# Patient Record
Sex: FEMALE | URBAN - METROPOLITAN AREA
[De-identification: names, ages, dates, MRNs, and addresses within clinical notes are randomized per-mention and may not be internally consistent; named-entity substitution may affect disease eponyms.]

---

## 2018-02-27 ENCOUNTER — INPATIENT (INPATIENT)
Facility: HOSPITAL | Age: 58
LOS: 5 days | Discharge: HOME | DRG: 871 | End: 2018-03-05
Attending: INTERNAL MEDICINE | Admitting: INTERNAL MEDICINE
Payer: MEDICAID

## 2018-02-27 VITALS
TEMPERATURE: 102 F | OXYGEN SATURATION: 95 % | HEART RATE: 103 BPM | RESPIRATION RATE: 18 BRPM | SYSTOLIC BLOOD PRESSURE: 131 MMHG | WEIGHT: 171.08 LBS | HEIGHT: 61 IN | DIASTOLIC BLOOD PRESSURE: 78 MMHG

## 2018-02-27 DIAGNOSIS — J10.1 INFLUENZA DUE TO OTHER IDENTIFIED INFLUENZA VIRUS WITH OTHER RESPIRATORY MANIFESTATIONS: ICD-10-CM

## 2018-02-27 DIAGNOSIS — K29.70 GASTRITIS, UNSPECIFIED, WITHOUT BLEEDING: ICD-10-CM

## 2018-02-27 DIAGNOSIS — Z98.890 OTHER SPECIFIED POSTPROCEDURAL STATES: Chronic | ICD-10-CM

## 2018-02-27 DIAGNOSIS — Z29.9 ENCOUNTER FOR PROPHYLACTIC MEASURES, UNSPECIFIED: ICD-10-CM

## 2018-02-27 LAB
ALBUMIN SERPL ELPH-MCNC: 3.6 G/DL — SIGNIFICANT CHANGE UP (ref 3.5–5)
ALP SERPL-CCNC: 96 U/L — SIGNIFICANT CHANGE UP (ref 40–120)
ALT FLD-CCNC: 48 U/L DA — SIGNIFICANT CHANGE UP (ref 10–60)
ANION GAP SERPL CALC-SCNC: 8 MMOL/L — SIGNIFICANT CHANGE UP (ref 5–17)
APPEARANCE UR: CLEAR — SIGNIFICANT CHANGE UP
APTT BLD: 31.8 SEC — SIGNIFICANT CHANGE UP (ref 27.5–37.4)
AST SERPL-CCNC: 30 U/L — SIGNIFICANT CHANGE UP (ref 10–40)
BASOPHILS # BLD AUTO: 0.1 K/UL — SIGNIFICANT CHANGE UP (ref 0–0.2)
BASOPHILS NFR BLD AUTO: 1.1 % — SIGNIFICANT CHANGE UP (ref 0–2)
BILIRUB SERPL-MCNC: 0.3 MG/DL — SIGNIFICANT CHANGE UP (ref 0.2–1.2)
BILIRUB UR-MCNC: NEGATIVE — SIGNIFICANT CHANGE UP
BUN SERPL-MCNC: 9 MG/DL — SIGNIFICANT CHANGE UP (ref 7–18)
CALCIUM SERPL-MCNC: 8.5 MG/DL — SIGNIFICANT CHANGE UP (ref 8.4–10.5)
CHLORIDE SERPL-SCNC: 103 MMOL/L — SIGNIFICANT CHANGE UP (ref 96–108)
CO2 SERPL-SCNC: 24 MMOL/L — SIGNIFICANT CHANGE UP (ref 22–31)
COLOR SPEC: YELLOW — SIGNIFICANT CHANGE UP
CREAT SERPL-MCNC: 0.66 MG/DL — SIGNIFICANT CHANGE UP (ref 0.5–1.3)
DIFF PNL FLD: ABNORMAL
EOSINOPHIL # BLD AUTO: 0 K/UL — SIGNIFICANT CHANGE UP (ref 0–0.5)
EOSINOPHIL NFR BLD AUTO: 0.4 % — SIGNIFICANT CHANGE UP (ref 0–6)
FLUAV SUBTYP SPEC NAA+PROBE: DETECTED
GLUCOSE SERPL-MCNC: 111 MG/DL — HIGH (ref 70–99)
GLUCOSE UR QL: NEGATIVE — SIGNIFICANT CHANGE UP
HCT VFR BLD CALC: 37.6 % — SIGNIFICANT CHANGE UP (ref 34.5–45)
HGB BLD-MCNC: 12.2 G/DL — SIGNIFICANT CHANGE UP (ref 11.5–15.5)
INR BLD: 1.2 RATIO — HIGH (ref 0.88–1.16)
KETONES UR-MCNC: NEGATIVE — SIGNIFICANT CHANGE UP
LACTATE SERPL-SCNC: 1.3 MMOL/L — SIGNIFICANT CHANGE UP (ref 0.7–2)
LEUKOCYTE ESTERASE UR-ACNC: NEGATIVE — SIGNIFICANT CHANGE UP
LYMPHOCYTES # BLD AUTO: 0.9 K/UL — LOW (ref 1–3.3)
LYMPHOCYTES # BLD AUTO: 15.2 % — SIGNIFICANT CHANGE UP (ref 13–44)
MCHC RBC-ENTMCNC: 29.5 PG — SIGNIFICANT CHANGE UP (ref 27–34)
MCHC RBC-ENTMCNC: 32.5 GM/DL — SIGNIFICANT CHANGE UP (ref 32–36)
MCV RBC AUTO: 90.7 FL — SIGNIFICANT CHANGE UP (ref 80–100)
MONOCYTES # BLD AUTO: 0.5 K/UL — SIGNIFICANT CHANGE UP (ref 0–0.9)
MONOCYTES NFR BLD AUTO: 8.1 % — SIGNIFICANT CHANGE UP (ref 2–14)
NEUTROPHILS # BLD AUTO: 4.3 K/UL — SIGNIFICANT CHANGE UP (ref 1.8–7.4)
NEUTROPHILS NFR BLD AUTO: 75.3 % — SIGNIFICANT CHANGE UP (ref 43–77)
NITRITE UR-MCNC: NEGATIVE — SIGNIFICANT CHANGE UP
PH UR: 5 — SIGNIFICANT CHANGE UP (ref 5–8)
PLATELET # BLD AUTO: 162 K/UL — SIGNIFICANT CHANGE UP (ref 150–400)
POTASSIUM SERPL-MCNC: 3.8 MMOL/L — SIGNIFICANT CHANGE UP (ref 3.5–5.3)
POTASSIUM SERPL-SCNC: 3.8 MMOL/L — SIGNIFICANT CHANGE UP (ref 3.5–5.3)
PROT SERPL-MCNC: 7.9 G/DL — SIGNIFICANT CHANGE UP (ref 6–8.3)
PROT UR-MCNC: 15
PROTHROM AB SERPL-ACNC: 13.1 SEC — HIGH (ref 9.8–12.7)
RAPID RVP RESULT: DETECTED
RBC # BLD: 4.14 M/UL — SIGNIFICANT CHANGE UP (ref 3.8–5.2)
RBC # FLD: 12.6 % — SIGNIFICANT CHANGE UP (ref 10.3–14.5)
SODIUM SERPL-SCNC: 135 MMOL/L — SIGNIFICANT CHANGE UP (ref 135–145)
SP GR SPEC: 1.02 — SIGNIFICANT CHANGE UP (ref 1.01–1.02)
UROBILINOGEN FLD QL: NEGATIVE — SIGNIFICANT CHANGE UP
WBC # BLD: 5.8 K/UL — SIGNIFICANT CHANGE UP (ref 3.8–10.5)
WBC # FLD AUTO: 5.8 K/UL — SIGNIFICANT CHANGE UP (ref 3.8–10.5)

## 2018-02-27 PROCEDURE — 71045 X-RAY EXAM CHEST 1 VIEW: CPT | Mod: 26

## 2018-02-27 PROCEDURE — 99285 EMERGENCY DEPT VISIT HI MDM: CPT

## 2018-02-27 RX ORDER — SODIUM CHLORIDE 9 MG/ML
500 INJECTION INTRAMUSCULAR; INTRAVENOUS; SUBCUTANEOUS
Qty: 0 | Refills: 0 | Status: DISCONTINUED | OUTPATIENT
Start: 2018-02-27 | End: 2018-02-27

## 2018-02-27 RX ORDER — PANTOPRAZOLE SODIUM 20 MG/1
40 TABLET, DELAYED RELEASE ORAL
Qty: 0 | Refills: 0 | Status: DISCONTINUED | OUTPATIENT
Start: 2018-02-27 | End: 2018-03-05

## 2018-02-27 RX ORDER — KETOROLAC TROMETHAMINE 30 MG/ML
30 SYRINGE (ML) INJECTION ONCE
Qty: 0 | Refills: 0 | Status: DISCONTINUED | OUTPATIENT
Start: 2018-02-27 | End: 2018-02-27

## 2018-02-27 RX ORDER — PANTOPRAZOLE SODIUM 20 MG/1
40 TABLET, DELAYED RELEASE ORAL ONCE
Qty: 0 | Refills: 0 | Status: COMPLETED | OUTPATIENT
Start: 2018-02-27 | End: 2018-02-27

## 2018-02-27 RX ORDER — SODIUM CHLORIDE 9 MG/ML
1000 INJECTION INTRAMUSCULAR; INTRAVENOUS; SUBCUTANEOUS
Qty: 0 | Refills: 0 | Status: DISCONTINUED | OUTPATIENT
Start: 2018-02-27 | End: 2018-02-27

## 2018-02-27 RX ORDER — SODIUM CHLORIDE 9 MG/ML
1000 INJECTION INTRAMUSCULAR; INTRAVENOUS; SUBCUTANEOUS
Qty: 0 | Refills: 0 | Status: DISCONTINUED | OUTPATIENT
Start: 2018-02-27 | End: 2018-03-05

## 2018-02-27 RX ORDER — ACETAMINOPHEN 500 MG
975 TABLET ORAL ONCE
Qty: 0 | Refills: 0 | Status: COMPLETED | OUTPATIENT
Start: 2018-02-27 | End: 2018-02-27

## 2018-02-27 RX ORDER — SUCRALFATE 1 G
1 TABLET ORAL
Qty: 0 | Refills: 0 | Status: DISCONTINUED | OUTPATIENT
Start: 2018-02-27 | End: 2018-03-05

## 2018-02-27 RX ORDER — OMEPRAZOLE 10 MG/1
1 CAPSULE, DELAYED RELEASE ORAL
Qty: 0 | Refills: 0 | COMMUNITY

## 2018-02-27 RX ORDER — OMEPRAZOLE 10 MG/1
0 CAPSULE, DELAYED RELEASE ORAL
Qty: 0 | Refills: 0 | COMMUNITY

## 2018-02-27 RX ORDER — SODIUM CHLORIDE 9 MG/ML
500 INJECTION INTRAMUSCULAR; INTRAVENOUS; SUBCUTANEOUS
Qty: 0 | Refills: 0 | Status: COMPLETED | OUTPATIENT
Start: 2018-02-27 | End: 2018-02-27

## 2018-02-27 RX ORDER — ENOXAPARIN SODIUM 100 MG/ML
40 INJECTION SUBCUTANEOUS DAILY
Qty: 0 | Refills: 0 | Status: DISCONTINUED | OUTPATIENT
Start: 2018-02-27 | End: 2018-03-05

## 2018-02-27 RX ORDER — LANOLIN ALCOHOL/MO/W.PET/CERES
3 CREAM (GRAM) TOPICAL AT BEDTIME
Qty: 0 | Refills: 0 | Status: DISCONTINUED | OUTPATIENT
Start: 2018-02-27 | End: 2018-03-05

## 2018-02-27 RX ORDER — ONDANSETRON 8 MG/1
4 TABLET, FILM COATED ORAL ONCE
Qty: 0 | Refills: 0 | Status: COMPLETED | OUTPATIENT
Start: 2018-02-27 | End: 2018-02-27

## 2018-02-27 RX ORDER — SODIUM CHLORIDE 9 MG/ML
3 INJECTION INTRAMUSCULAR; INTRAVENOUS; SUBCUTANEOUS ONCE
Qty: 0 | Refills: 0 | Status: COMPLETED | OUTPATIENT
Start: 2018-02-27 | End: 2018-02-27

## 2018-02-27 RX ORDER — ONDANSETRON 8 MG/1
4 TABLET, FILM COATED ORAL EVERY 4 HOURS
Qty: 0 | Refills: 0 | Status: DISCONTINUED | OUTPATIENT
Start: 2018-02-27 | End: 2018-03-05

## 2018-02-27 RX ORDER — INFLUENZA VIRUS VACCINE 15; 15; 15; 15 UG/.5ML; UG/.5ML; UG/.5ML; UG/.5ML
0.5 SUSPENSION INTRAMUSCULAR ONCE
Qty: 0 | Refills: 0 | Status: COMPLETED | OUTPATIENT
Start: 2018-02-27 | End: 2018-02-27

## 2018-02-27 RX ORDER — ACETAMINOPHEN 500 MG
650 TABLET ORAL EVERY 6 HOURS
Qty: 0 | Refills: 0 | Status: DISCONTINUED | OUTPATIENT
Start: 2018-02-27 | End: 2018-03-05

## 2018-02-27 RX ADMIN — Medication 200 MILLIGRAM(S): at 22:19

## 2018-02-27 RX ADMIN — SODIUM CHLORIDE 2000 MILLILITER(S): 9 INJECTION INTRAMUSCULAR; INTRAVENOUS; SUBCUTANEOUS at 11:44

## 2018-02-27 RX ADMIN — Medication 30 MILLIGRAM(S): at 19:15

## 2018-02-27 RX ADMIN — ONDANSETRON 4 MILLIGRAM(S): 8 TABLET, FILM COATED ORAL at 18:01

## 2018-02-27 RX ADMIN — Medication 975 MILLIGRAM(S): at 11:43

## 2018-02-27 RX ADMIN — SODIUM CHLORIDE 2000 MILLILITER(S): 9 INJECTION INTRAMUSCULAR; INTRAVENOUS; SUBCUTANEOUS at 11:45

## 2018-02-27 RX ADMIN — SODIUM CHLORIDE 3 MILLILITER(S): 9 INJECTION INTRAMUSCULAR; INTRAVENOUS; SUBCUTANEOUS at 11:42

## 2018-02-27 RX ADMIN — Medication 3 MILLIGRAM(S): at 23:06

## 2018-02-27 RX ADMIN — PANTOPRAZOLE SODIUM 40 MILLIGRAM(S): 20 TABLET, DELAYED RELEASE ORAL at 18:01

## 2018-02-27 RX ADMIN — SODIUM CHLORIDE 75 MILLILITER(S): 9 INJECTION INTRAMUSCULAR; INTRAVENOUS; SUBCUTANEOUS at 22:19

## 2018-02-27 RX ADMIN — Medication 75 MILLIGRAM(S): at 15:04

## 2018-02-27 RX ADMIN — SODIUM CHLORIDE 2000 MILLILITER(S): 9 INJECTION INTRAMUSCULAR; INTRAVENOUS; SUBCUTANEOUS at 12:12

## 2018-02-27 RX ADMIN — Medication 650 MILLIGRAM(S): at 21:12

## 2018-02-27 RX ADMIN — SODIUM CHLORIDE 2000 MILLILITER(S): 9 INJECTION INTRAMUSCULAR; INTRAVENOUS; SUBCUTANEOUS at 12:43

## 2018-02-27 RX ADMIN — SODIUM CHLORIDE 2000 MILLILITER(S): 9 INJECTION INTRAMUSCULAR; INTRAVENOUS; SUBCUTANEOUS at 12:11

## 2018-02-27 RX ADMIN — SODIUM CHLORIDE 150 MILLILITER(S): 9 INJECTION INTRAMUSCULAR; INTRAVENOUS; SUBCUTANEOUS at 18:03

## 2018-02-27 RX ADMIN — SODIUM CHLORIDE 75 MILLILITER(S): 9 INJECTION INTRAMUSCULAR; INTRAVENOUS; SUBCUTANEOUS at 19:16

## 2018-02-27 RX ADMIN — Medication 30 MILLIGRAM(S): at 15:04

## 2018-02-27 NOTE — ED PROVIDER NOTE - OBJECTIVE STATEMENT
57 y/o F pt with PMHx of gastritis presents to ED c/o fever, body aches, and cough x yesterday. Pt reports visiting her grandchildren last week who were sick with the flu. Pt did not have her flu-shot this season. Pt denies h/o asthma or DM. Pt denies neck pain, headache, abd pain, urinary symptoms, or any other complaints. NKDA.

## 2018-02-27 NOTE — H&P ADULT - NEGATIVE CARDIOVASCULAR SYMPTOMS
no chest pain/no dyspnea on exertion/no paroxysmal nocturnal dyspnea/no peripheral edema/no orthopnea/no palpitations

## 2018-02-27 NOTE — H&P ADULT - NSHPOUTPATIENTPROVIDERS_GEN_ALL_CORE
Dr Hannon Dr Hannon (gastroenterologist: [phone])  [facility] pharmacy: [phone]

## 2018-02-27 NOTE — H&P ADULT - MUSCULOSKELETAL
detailed exam no calf tenderness/ROM intact/no joint warmth/normal strength/no joint swelling/no joint erythema details…

## 2018-02-27 NOTE — MEDICAL STUDENT ADULT H&P (EDUCATION) - NS MD HP STUD SOCIAL HX FT
Denies smoking or ETOH use. 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 Lost health insurance.

## 2018-02-27 NOTE — ED PROVIDER NOTE - PROGRESS NOTE DETAILS
Pt is feeling much better, asking for food and would like to be discharged. Risks, benefits, alternatives, hazards and precautions reviewed. Pt was initially asking to leave because she was feeling better, but than she vomited 1 time. Will admit for iv hydration, antiemetics, monitoring and tamiflu.

## 2018-02-27 NOTE — H&P ADULT - PROBLEM SELECTOR PLAN 1
Patient presented with generalized weakness, fever, sore throat, cough x yesterday  Sick contact: grandson  s/p code sepsis in ED: 1L NS bolus + Levaquin 500mg IVPB + Tamiflu 75 mg  f/up Bcx and UCx  CXR: devoid of consolidations or effusion   EKG: NSR VR@94bpm  Continue with Tamiflu 75 mg BID x 5 days  Tylenol 650 mg PRN Fever  Zofran 4mg IV PRN N/V Patient presented with generalized weakness, fever, sore throat, cough x yesterday  Sick contact: grandson  s/p code sepsis in ED: 1L NS bolus + Levaquin 500mg IVPB + Tamiflu 75 mg  f/up Bcx and UCx  CXR: devoid of consolidations or effusion   EKG: NSR VR@94bpm  Continue with Tamiflu 75 mg BID x 5 days  Tylenol 650 mg PRN Fever  Zofran 4mg IV PRN N/V  Droplet isolation

## 2018-02-27 NOTE — ED PROVIDER NOTE - ENMT, MLM
Airway patent, Nasal mucosa clear. Mouth with normal mucosa. Throat has no vesicles, no oropharyngeal exudates and uvula is midline. No meningeal signs.

## 2018-02-27 NOTE — H&P ADULT - HISTORY OF PRESENT ILLNESS
Stacie Ricks is a 58F with PMHx gastritis, PUD who presented to ED c/o generalized weakness, fever that started yesterday. Patient refers taking care of grandchildren who were sick for the past week. Patient symptoms are accompanied by dry cough, sore throat, rhinorrhea, nausea and arthralgia. Patient was not vaccinated for influenza. Denies ear pain, pleuritic chest pain, diarrhea. Stacie Ricks is a 58F with PMHx gastritis, PUD who presented to ED c/o generalized weakness, fever that started yesterday. Patient refers taking care of grandchildren who were sick for the past week. Patient symptoms are accompanied by dry cough, sore throat, rhinorrhea, nausea and arthralgia. Patient was not vaccinated for influenza. Denies ear pain, pleuritic chest pain, diarrhea.  Refers taking Mucinex w/o improvement of symptoms.       ED course:   Vitals were: BP :131/78 mmHg, HR: 103 bpm, RR: 18 rpm, SaO2: 95% RA, T: 102.2 F  RVP: Influenza A  s/p code sepsis: 1L NS bolus + Levaquin 500 mg IVPB once + Tamiflu 75 mg + Zofran 4mg  Patient initially wanted to leave ED, but became nauseous and vomited x1.

## 2018-02-27 NOTE — MEDICAL STUDENT ADULT H&P (EDUCATION) - NS MD HP STUD PE ENMT FT
Tympanic membranes visualized, grey, reflect cone of light. No pharyngeal erythema, moist mucous membranes. Dental caries

## 2018-02-27 NOTE — MEDICAL STUDENT ADULT H&P (EDUCATION) - NS MD HP STUD HX OF PRESENT ILLNESS FT
Patient is a 57 yo  female with a PMHx of gastritis, broken wrist with closed reduction, and hysterectomy due due pre-endometrial cancer, present with symptoms of the flu. Yesterday afternoon patient started developed dry cough, with pressure like chest pain rated 8/10, throat pain rated 9/10, and headache in band-like distrubution rated 7/10, knee pain radiating up the thighs rated 9/10, and some arm pain. She also complains of some SOB. Tried taking Mucinex for cough, experienced some relief. 1 hour patient was asking to leave the ED when she became nauseous and vomited. Vomitus contained some blood. Patient also felt nauseous during interview, attempted to but did not vomit. Patient has sick contacts, exposed to grandson with cough last week, and nephew last Friday who had either the flu or a cold.

## 2018-02-27 NOTE — H&P ADULT - PROBLEM SELECTOR PLAN 3
IMPROVE VTE Individual Risk Assessment          RISK                                                          Points  [  ] Previous VTE                                                3  [  ] Thrombophilia                                             2  [ x ] Lower limb paralysis                                   2        (unable to hold up >15 seconds)    [  ] Current Cancer                                             2         (within 6 months)  [ x ] Immobilization > 24 hrs                              1  [  ] ICU/CCU stay > 24 hours                             1  [  ] Age > 60                                                         1    IMPROVE VTE Score: 3  Lovenox 40 mg SQ QD for DVT chemoppx

## 2018-02-27 NOTE — H&P ADULT - PROBLEM SELECTOR PLAN 2
Patient refers being diagnosed with PUD and Gastritis 2yrs ago (EGD)  Omeprazole 40 mg QD + Sucralfate 1g QD

## 2018-02-28 DIAGNOSIS — J06.9 ACUTE UPPER RESPIRATORY INFECTION, UNSPECIFIED: ICD-10-CM

## 2018-02-28 LAB
ANION GAP SERPL CALC-SCNC: 7 MMOL/L — SIGNIFICANT CHANGE UP (ref 5–17)
BASOPHILS # BLD AUTO: 0 K/UL — SIGNIFICANT CHANGE UP (ref 0–0.2)
BASOPHILS NFR BLD AUTO: 1.3 % — SIGNIFICANT CHANGE UP (ref 0–2)
BUN SERPL-MCNC: 5 MG/DL — LOW (ref 7–18)
CALCIUM SERPL-MCNC: 8.2 MG/DL — LOW (ref 8.4–10.5)
CHLORIDE SERPL-SCNC: 109 MMOL/L — HIGH (ref 96–108)
CO2 SERPL-SCNC: 23 MMOL/L — SIGNIFICANT CHANGE UP (ref 22–31)
CREAT SERPL-MCNC: 0.6 MG/DL — SIGNIFICANT CHANGE UP (ref 0.5–1.3)
CULTURE RESULTS: SIGNIFICANT CHANGE UP
EOSINOPHIL # BLD AUTO: 0 K/UL — SIGNIFICANT CHANGE UP (ref 0–0.5)
EOSINOPHIL NFR BLD AUTO: 0.3 % — SIGNIFICANT CHANGE UP (ref 0–6)
GLUCOSE BLDC GLUCOMTR-MCNC: 102 MG/DL — HIGH (ref 70–99)
GLUCOSE BLDC GLUCOMTR-MCNC: 93 MG/DL — SIGNIFICANT CHANGE UP (ref 70–99)
GLUCOSE SERPL-MCNC: 91 MG/DL — SIGNIFICANT CHANGE UP (ref 70–99)
HCT VFR BLD CALC: 34.7 % — SIGNIFICANT CHANGE UP (ref 34.5–45)
HGB BLD-MCNC: 11.4 G/DL — LOW (ref 11.5–15.5)
LYMPHOCYTES # BLD AUTO: 1.6 K/UL — SIGNIFICANT CHANGE UP (ref 1–3.3)
LYMPHOCYTES # BLD AUTO: 40.4 % — SIGNIFICANT CHANGE UP (ref 13–44)
MAGNESIUM SERPL-MCNC: 2 MG/DL — SIGNIFICANT CHANGE UP (ref 1.6–2.6)
MCHC RBC-ENTMCNC: 30 PG — SIGNIFICANT CHANGE UP (ref 27–34)
MCHC RBC-ENTMCNC: 32.8 GM/DL — SIGNIFICANT CHANGE UP (ref 32–36)
MCV RBC AUTO: 91.4 FL — SIGNIFICANT CHANGE UP (ref 80–100)
MONOCYTES # BLD AUTO: 0.4 K/UL — SIGNIFICANT CHANGE UP (ref 0–0.9)
MONOCYTES NFR BLD AUTO: 10.8 % — SIGNIFICANT CHANGE UP (ref 2–14)
NEUTROPHILS # BLD AUTO: 1.8 K/UL — SIGNIFICANT CHANGE UP (ref 1.8–7.4)
NEUTROPHILS NFR BLD AUTO: 47.2 % — SIGNIFICANT CHANGE UP (ref 43–77)
PHOSPHATE SERPL-MCNC: 2.8 MG/DL — SIGNIFICANT CHANGE UP (ref 2.5–4.5)
PLATELET # BLD AUTO: 140 K/UL — LOW (ref 150–400)
POTASSIUM SERPL-MCNC: 3.9 MMOL/L — SIGNIFICANT CHANGE UP (ref 3.5–5.3)
POTASSIUM SERPL-SCNC: 3.9 MMOL/L — SIGNIFICANT CHANGE UP (ref 3.5–5.3)
RBC # BLD: 3.8 M/UL — SIGNIFICANT CHANGE UP (ref 3.8–5.2)
RBC # FLD: 13.1 % — SIGNIFICANT CHANGE UP (ref 10.3–14.5)
SODIUM SERPL-SCNC: 139 MMOL/L — SIGNIFICANT CHANGE UP (ref 135–145)
SPECIMEN SOURCE: SIGNIFICANT CHANGE UP
WBC # BLD: 3.9 K/UL — SIGNIFICANT CHANGE UP (ref 3.8–10.5)
WBC # FLD AUTO: 3.9 K/UL — SIGNIFICANT CHANGE UP (ref 3.8–10.5)

## 2018-02-28 RX ORDER — ACETAMINOPHEN 500 MG
650 TABLET ORAL EVERY 6 HOURS
Qty: 0 | Refills: 0 | Status: DISCONTINUED | OUTPATIENT
Start: 2018-02-28 | End: 2018-03-05

## 2018-02-28 RX ADMIN — Medication 1 GRAM(S): at 11:50

## 2018-02-28 RX ADMIN — Medication 75 MILLIGRAM(S): at 18:01

## 2018-02-28 RX ADMIN — Medication 650 MILLIGRAM(S): at 05:03

## 2018-02-28 RX ADMIN — Medication 650 MILLIGRAM(S): at 18:30

## 2018-02-28 RX ADMIN — Medication 75 MILLIGRAM(S): at 05:04

## 2018-02-28 RX ADMIN — Medication 3 MILLIGRAM(S): at 21:27

## 2018-02-28 RX ADMIN — PANTOPRAZOLE SODIUM 40 MILLIGRAM(S): 20 TABLET, DELAYED RELEASE ORAL at 05:04

## 2018-02-28 RX ADMIN — Medication 650 MILLIGRAM(S): at 18:01

## 2018-02-28 RX ADMIN — Medication 30 MILLILITER(S): at 21:44

## 2018-02-28 RX ADMIN — ENOXAPARIN SODIUM 40 MILLIGRAM(S): 100 INJECTION SUBCUTANEOUS at 11:51

## 2018-02-28 NOTE — CONSULT NOTE ADULT - SUBJECTIVE AND OBJECTIVE BOX
PULMONARY CONSULT NOTE      STACIE KAHN  MRN-[id number]    Patient is a 58y old  Female who presents with a chief complaint of Influenza A (2018 18:15)      History of Present Illness:  Chief Complaint/Reason for Admission: Influenza A	  History of Present Illness: 	  Stacie Kahn is a 58F with PMHx gastritis, PUD who presented to ED c/o generalized weakness, fever that started yesterday. Patient refers taking care of grandchildren who were sick for the past week. Patient symptoms are accompanied by dry cough, sore throat, rhinorrhea, nausea and arthralgia. Patient was not vaccinated for influenza. Denies ear pain, pleuritic chest pain, diarrhea.  Refers taking Mucinex w/o improvement of symptoms.       HISTORY OF PRESENT ILLNESS:    MEDICATIONS  (STANDING):  enoxaparin Injectable 40 milliGRAM(s) SubCutaneous daily  melatonin 3 milliGRAM(s) Oral at bedtime  oseltamivir 75 milliGRAM(s) Oral two times a day  pantoprazole    Tablet 40 milliGRAM(s) Oral before breakfast  sodium chloride 0.9%. 1000 milliLiter(s) (75 mL/Hr) IV Continuous <Continuous>  sucralfate suspension 1 Gram(s) Oral daily      MEDICATIONS  (PRN):  acetaminophen   Tablet 650 milliGRAM(s) Oral every 6 hours PRN For Temp greater than 38 C (100.4 F)  guaiFENesin   Syrup  (Sugar-Free) 200 milliGRAM(s) Oral every 6 hours PRN Cough  ondansetron Injectable 4 milliGRAM(s) IV Push every 4 hours PRN Nausea and/or Vomiting      Allergies    No Known Allergies    Intolerances        PAST MEDICAL & SURGICAL HISTORY:  Gastritis  H/O: hysterectomy: 2002      FAMILY HISTORY:  No pertinent family history in first degree relatives      SOCIAL HISTORY  Smoking History:     REVIEW OF SYSTEMS:    CONSTITUTIONAL:  No fevers, chills, sweats    HEENT:  Eyes:  No diplopia or blurred vision. ENT:  No earache, sore throat or runny nose.    CARDIOVASCULAR:  No pressure, squeezing, tightness, or heaviness about the chest; no palpitations.    RESPIRATORY:  Per HPI    GASTROINTESTINAL:  No abdominal pain, nausea, vomiting or diarrhea.    GENITOURINARY:  No dysuria, frequency or urgency.    NEUROLOGIC:  No paresthesias, fasciculations, seizures or weakness.    PSYCHIATRIC:  No disorder of thought or mood.    Vital Signs Last 24 Hrs  T(C): 37.2 (2018 09:28), Max: 38.9 (2018 21:12)  T(F): 98.9 (2018 09:28), Max: 102.1 (2018 21:12)  HR: 89 (2018 04:30) (81 - 89)  BP: 136/76 (2018 04:30) (121/67 - 145/75)  BP(mean): --  RR: 18 (2018 04:30) (17 - 18)  SpO2: 99% (2018 04:30) (98% - 99%)  I&O's Detail    2018 07:01  -  2018 07:00  --------------------------------------------------------  IN:    sodium chloride 0.9%.: 675 mL  Total IN: 675 mL    OUT:  Total OUT: 0 mL    Total NET: 675 mL          PHYSICAL EXAMINATION:    GENERAL: The patient is a well-developed, well-nourished _____in no apparent distress.     HEENT: Head is normocephalic and atraumatic. Extraocular muscles are intact. Mucous membranes are moist.     NECK: Supple.     LUNGS: Ronchi posteriorly    HEART: Regular rate and rhythm without murmur.    ABDOMEN: Soft, nontender, and nondistended.  No hepatosplenomegaly is noted.    EXTREMITIES: Without any cyanosis, clubbing, rash, lesions or edema.    NEUROLOGIC: Grossly intact.      LABS:                        11.4   3.9   )-----------( 140      ( 2018 08:48 )             34.7     -28    139  |  109<H>  |  5<L>  ----------------------------<  91  3.9   |  23  |  0.60    Ca    8.2<L>      2018 08:48  Phos  2.8       Mg     2.0     02-28    TPro  7.9  /  Alb  3.6  /  TBili  0.3  /  DBili  x   /  AST  30  /  ALT  48  /  AlkPhos  96  -    PT/INR - ( 2018 12:05 )   PT: 13.1 sec;   INR: 1.20 ratio         PTT - ( 2018 12:05 )  PTT:31.8 sec  Urinalysis Basic - ( 2018 12:24 )    Color: Yellow / Appearance: Clear / S.025 / pH: x  Gluc: x / Ketone: Negative  / Bili: Negative / Urobili: Negative   Blood: x / Protein: 15 / Nitrite: Negative   Leuk Esterase: Negative / RBC: 0-2 /HPF / WBC 0-2 /HPF   Sq Epi: x / Non Sq Epi: Moderate /HPF / Bacteria: Few /HPF                      MICROBIOLOGY:    RADIOLOGY & ADDITIONAL STUDIES:    CXR:  < from: Xray Chest 1 View AP/PA (18 @ 12:38) >  IMPRESSION: No focal consolidation or pleural effusion.       < end of copied text >    Ct scan chest:    ekg;    echo:

## 2018-02-28 NOTE — PROGRESS NOTE ADULT - SUBJECTIVE AND OBJECTIVE BOX
58F with PMHx gastritis, PUD who presented to ED c/o generalized weakness, fever that started yesterday. Patient refers taking care of grandchildren who were sick for the past week. Patient symptoms are accompanied by dry cough, sore throat, rhinorrhea, nausea and arthralgia. Patient was not vaccinated for influenza. Denies ear pain, pleuritic chest pain, diarrhea.  Refers taking Mucinex w/o improvement of symptoms.       ED course:   Vitals were: BP :131/78 mmHg, HR: 103 bpm, RR: 18 rpm, SaO2: 95% RA, T: 102.2 F  RVP: Influenza A  s/p code sepsis: 1L NS bolus + Levaquin 500 mg IVPB once + Tamiflu 75 mg + Zofran 4mg  Patient initially wanted to leave ED, but became nauseous and vomited x1.    Review of Systems:  · General Symptoms	fever; chills; sweating; fatigue	  · Negative Skin Symptoms	no rash	  · Ophthalmologic	negative	  · Negative ENMT Symptoms	no ear pain; no tinnitus; no vertigo	  · ENMT Symptoms	nasal congestion  sore throat	  · Negative Respiratory and Thorax Symptoms	no wheezing; no hemoptysis; no pleuritic chest pain	  · Respiratory and Thorax Symptoms	dyspnea  cough	  · Negative Cardiovascular Symptoms	no chest pain; no palpitations; no dyspnea on exertion; no orthopnea; no paroxysmal nocturnal dyspnea; no peripheral edema	  · Negative Gastrointestinal Symptoms	no diarrhea	  · Gastrointestinal Symptoms	nausea; vomiting; flatulence; abdominal pain	  · Musculoskeletal Symptoms	arthralgia; myalgia; muscle weakness	  · Neurological	negative	  · Psychiatric	negative	      pt seen in icu [  ], reg med floor [ x  ], bed [ x ], chair at bedside [   ], a+o x3 [ x ], lethargic [  ],  nad [ x ]      Allergies    No Known Allergies        Vitals    T(F): 99.5 (02-28-18 @ 20:03), Max: 102.1 (02-27-18 @ 21:12)  HR: 72 (02-28-18 @ 20:03) (72 - 89)  BP: 126/77 (02-28-18 @ 20:03) (126/77 - 145/75)  RR: 19 (02-28-18 @ 20:03) (17 - 19)  SpO2: 96% (02-28-18 @ 20:03) (96% - 99%)  Wt(kg): --  CAPILLARY BLOOD GLUCOSE      POCT Blood Glucose.: 102 mg/dL (28 Feb 2018 11:15)      Labs                          11.4   3.9   )-----------( 140      ( 28 Feb 2018 08:48 )             34.7       02-28    139  |  109<H>  |  5<L>  ----------------------------<  91  3.9   |  23  |  0.60    Ca    8.2<L>      28 Feb 2018 08:48  Phos  2.8     02-28  Mg     2.0     02-28    TPro  7.9  /  Alb  3.6  /  TBili  0.3  /  DBili  x   /  AST  30  /  ALT  48  /  AlkPhos  96  02-27            .Urine Clean Catch (Midstream)  02-27 @ 17:56   <10,000 CFU/ml  Normal Urogenital delio present  --  --          Radiology Results      Meds    MEDICATIONS  (STANDING):  enoxaparin Injectable 40 milliGRAM(s) SubCutaneous daily  melatonin 3 milliGRAM(s) Oral at bedtime  oseltamivir 75 milliGRAM(s) Oral two times a day  pantoprazole    Tablet 40 milliGRAM(s) Oral before breakfast  sodium chloride 0.9%. 1000 milliLiter(s) (75 mL/Hr) IV Continuous <Continuous>  sucralfate suspension 1 Gram(s) Oral daily      MEDICATIONS  (PRN):  acetaminophen   Tablet 650 milliGRAM(s) Oral every 6 hours PRN For Temp greater than 38 C (100.4 F)  acetaminophen   Tablet. 650 milliGRAM(s) Oral every 6 hours PRN Moderate Pain (4 - 6)  guaiFENesin   Syrup  (Sugar-Free) 200 milliGRAM(s) Oral every 6 hours PRN Cough  ondansetron Injectable 4 milliGRAM(s) IV Push every 4 hours PRN Nausea and/or Vomiting      Physical Exam    Neuro :  no focal deficits  Respiratory: CTA B/L  CV: RRR, S1S2, no murmurs,   Abdominal: Soft, NT, ND +BS,  Extremities: No edema, + peripheral pulses    ASSESSMENT    sepsis 2nd to flu on adm  Influenza with other respiratory manifestations, other influenza virus identified  h/o Gastritis  H/O: hysterectomy  No significant past surgical history      PLAN    cont tamiflu to complete 5 days  cont tylenol  cont guaifenesin prn  pulm cons noted  cont current meds

## 2018-03-01 ENCOUNTER — OUTPATIENT (OUTPATIENT)
Dept: OUTPATIENT SERVICES | Facility: HOSPITAL | Age: 58
LOS: 1 days | End: 2018-03-01
Payer: MEDICAID

## 2018-03-01 DIAGNOSIS — R94.31 ABNORMAL ELECTROCARDIOGRAM [ECG] [EKG]: ICD-10-CM

## 2018-03-01 DIAGNOSIS — Z98.890 OTHER SPECIFIED POSTPROCEDURAL STATES: Chronic | ICD-10-CM

## 2018-03-01 LAB
CK MB BLD-MCNC: <1.1 % — SIGNIFICANT CHANGE UP (ref 0–3.5)
CK MB CFR SERPL CALC: <1 NG/ML — SIGNIFICANT CHANGE UP (ref 0–3.6)
CK SERPL-CCNC: 91 U/L — SIGNIFICANT CHANGE UP (ref 21–215)
TROPONIN I SERPL-MCNC: <0.015 NG/ML — SIGNIFICANT CHANGE UP (ref 0–0.04)

## 2018-03-01 PROCEDURE — G9001: CPT

## 2018-03-01 RX ORDER — ASPIRIN/CALCIUM CARB/MAGNESIUM 324 MG
81 TABLET ORAL DAILY
Qty: 0 | Refills: 0 | Status: DISCONTINUED | OUTPATIENT
Start: 2018-03-01 | End: 2018-03-05

## 2018-03-01 RX ORDER — FAMOTIDINE 10 MG/ML
20 INJECTION INTRAVENOUS ONCE
Qty: 0 | Refills: 0 | Status: COMPLETED | OUTPATIENT
Start: 2018-03-01 | End: 2018-03-01

## 2018-03-01 RX ORDER — ATORVASTATIN CALCIUM 80 MG/1
10 TABLET, FILM COATED ORAL AT BEDTIME
Qty: 0 | Refills: 0 | Status: DISCONTINUED | OUTPATIENT
Start: 2018-03-01 | End: 2018-03-05

## 2018-03-01 RX ADMIN — Medication 650 MILLIGRAM(S): at 09:13

## 2018-03-01 RX ADMIN — Medication 200 MILLIGRAM(S): at 09:06

## 2018-03-01 RX ADMIN — Medication 1 GRAM(S): at 23:06

## 2018-03-01 RX ADMIN — Medication 3 MILLIGRAM(S): at 23:06

## 2018-03-01 RX ADMIN — Medication 1 GRAM(S): at 11:54

## 2018-03-01 RX ADMIN — ATORVASTATIN CALCIUM 10 MILLIGRAM(S): 80 TABLET, FILM COATED ORAL at 23:06

## 2018-03-01 RX ADMIN — ENOXAPARIN SODIUM 40 MILLIGRAM(S): 100 INJECTION SUBCUTANEOUS at 11:55

## 2018-03-01 RX ADMIN — Medication 1 GRAM(S): at 17:13

## 2018-03-01 RX ADMIN — Medication 75 MILLIGRAM(S): at 17:13

## 2018-03-01 RX ADMIN — FAMOTIDINE 20 MILLIGRAM(S): 10 INJECTION INTRAVENOUS at 11:54

## 2018-03-01 RX ADMIN — Medication 75 MILLIGRAM(S): at 05:05

## 2018-03-01 RX ADMIN — Medication 81 MILLIGRAM(S): at 12:59

## 2018-03-01 RX ADMIN — PANTOPRAZOLE SODIUM 40 MILLIGRAM(S): 20 TABLET, DELAYED RELEASE ORAL at 05:05

## 2018-03-01 RX ADMIN — Medication 650 MILLIGRAM(S): at 11:59

## 2018-03-01 NOTE — CONSULT NOTE ADULT - ASSESSMENT
58 yr old Female with PMHx gastritis, PUD who presented to ED c/o generalized weakness, fever ,Influenza no with CP,SOB and EKG changes.  1.Transfer to Tele.  2.CEq8x3.  3.Echocardiogram.  4.CTA-r/o PE.  5.ABX.  6.Continue asa,statin.  7.GI and DVT prophylaxis.

## 2018-03-01 NOTE — CONSULT NOTE ADULT - SUBJECTIVE AND OBJECTIVE BOX
CHIEF COMPLAINT:Patient is a 58y old  Female who presents with a chief complaint of Influenza A .      HPI:  Stacie Ricks is a 58 yr old Female with PMHx gastritis, PUD who presented to ED c/o generalized weakness, fever that started yesterday. Patient refers taking care of grandchildren who were sick for the past week. Patient symptoms are accompanied by dry cough, sore throat, rhinorrhea, nausea and arthralgia. Patient was not vaccinated for influenza. Denies ear pain, pleuritic chest pain, diarrhea.  Refers taking Mucinex w/o improvement of symptoms.           PAST MEDICAL & SURGICAL HISTORY:  Gastritis  H/O: hysterectomy: 2002      MEDICATIONS  (STANDING):  aspirin  chewable 81 milliGRAM(s) Oral daily  atorvastatin 10 milliGRAM(s) Oral at bedtime  enoxaparin Injectable 40 milliGRAM(s) SubCutaneous daily  levoFLOXacin  Tablet 500 milliGRAM(s) Oral every 24 hours  melatonin 3 milliGRAM(s) Oral at bedtime  oseltamivir 75 milliGRAM(s) Oral two times a day  pantoprazole    Tablet 40 milliGRAM(s) Oral before breakfast  sodium chloride 0.9%. 1000 milliLiter(s) (75 mL/Hr) IV Continuous <Continuous>  sucralfate suspension 1 Gram(s) Oral daily    MEDICATIONS  (PRN):  acetaminophen   Tablet 650 milliGRAM(s) Oral every 6 hours PRN For Temp greater than 38 C (100.4 F)  acetaminophen   Tablet. 650 milliGRAM(s) Oral every 6 hours PRN Moderate Pain (4 - 6)  aluminum hydroxide/magnesium hydroxide/simethicone Suspension 30 milliLiter(s) Oral every 6 hours PRN Dyspepsia  guaiFENesin   Syrup  (Sugar-Free) 200 milliGRAM(s) Oral every 6 hours PRN Cough  ondansetron Injectable 4 milliGRAM(s) IV Push every 4 hours PRN Nausea and/or Vomiting      FAMILY HISTORY:  No pertinent family history in first degree relatives      SOCIAL HISTORY:    [x ] Non-smoker    [x ] Alcohol    Allergies    No Known Allergies    Intolerances    	    REVIEW OF SYSTEMS:  CONSTITUTIONAL: No fever, weight loss, or fatigue  EYES: No eye pain, visual disturbances, or discharge  ENT:  No difficulty hearing, tinnitus, vertigo; No sinus or throat pain  NECK: No pain or stiffness  RESPIRATORY: No cough, wheezing, chills or hemoptysis; + Shortness of Breath  CARDIOVASCULAR: + chest pain,No palpitations, passing out, dizziness, or leg swelling  GASTROINTESTINAL: No abdominal or epigastric pain. No nausea, vomiting, or hematemesis; No diarrhea or constipation. No melena or hematochezia.  GENITOURINARY: No dysuria, frequency, hematuria, or incontinence  NEUROLOGICAL: No headaches, memory loss, loss of strength, numbness, or tremors  SKIN: No itching, burning, rashes, or lesions   LYMPH Nodes: No enlarged glands  ENDOCRINE: No heat or cold intolerance; No hair loss  MUSCULOSKELETAL: No joint pain or swelling; No muscle, back, or extremity pain  PSYCHIATRIC: No depression, anxiety, mood swings, or difficulty sleeping  HEME/LYMPH: No easy bruising, or bleeding gums  ALLERGY AND IMMUNOLOGIC: No hives or eczema	      PHYSICAL EXAM:  T(C): 37.6 (03-01-18 @ 09:06), Max: 37.6 (03-01-18 @ 09:06)  HR: 71 (03-01-18 @ 09:06) (69 - 72)  BP: 117/71 (03-01-18 @ 09:06) (117/71 - 127/68)  RR: 16 (03-01-18 @ 09:06) (16 - 19)  SpO2: 94% (03-01-18 @ 09:06) (94% - 99%)      Appearance: Normal	  HEENT:   Normal oral mucosa, PERRL, EOMI	  Lymphatic: No lymphadenopathy  Cardiovascular: Normal S1 S2, No JVD, No murmurs, No edema  Respiratory: Lungs clear to auscultation	  Psychiatry: A & O x 3, Mood & affect appropriate  Gastrointestinal:  Soft, Non-tender, + BS	  Skin: No rashes, No ecchymoses, No cyanosis	  Neurologic: Non-focal  Extremities: Normal range of motion, No clubbing, cyanosis or edema  Vascular: Peripheral pulses palpable 2+ bilaterally    	    ECG:  NSR with T wave inversion kelsi-lateral leads	    	  LABS:	 	    CARDIAC MARKERS:  CARDIAC MARKERS ( 01 Mar 2018 10:08 )  <0.015 ng/mL / x     / 91 U/L / x     / <1.0 ng/mL                              11.4   3.9   )-----------( 140      ( 28 Feb 2018 08:48 )             34.7     02-28    139  |  109<H>  |  5<L>  ----------------------------<  91  3.9   |  23  |  0.60    Ca    8.2<L>      28 Feb 2018 08:48  Phos  2.8     02-28  Mg     2.0     02-28      Rapid Respiratory Viral Panel (02.27.18 @ 14:33)    Rapid RVP Result: Detected: The [employer] RVP Rapid uses polymerase chain reaction (PCR) and melt  curve analysis to screen for adenovirus; coronavirus HKU1, NL63, 229E,  OC43; human metapneumovirus (hMPV); human enterovirus/rhinovirus  (Entero/RV); influenza A; influenza A/H1;influenza A/H3; influenza  A/H1-2009; influenza B; parainfluenza viruses 1, 2, 3, 4; respiratory  syncytial virus; Bordetella pertussis; Mycoplasma pneumoniae; and  Chlamydophila pneumoniae.    Influenza A (RapRVP): Detected: TYPE:(C=Critical, N=Notification, A=Abnormal) C  TESTS: INFLUENZA  DATE/TIME CALLED: 02/27/18 14:36, 02/27/18 14:38  CALLED TO: DR. DUYEN MCCOLLUM  READ BACK (2 Patient Identifiers)(Y/N): Y  READ BACK VALUES (Y/N): Y  CALLED BY: NELSON,02/27/18 14:42  INFLUENZA A, EQUIVOCAL

## 2018-03-01 NOTE — PROGRESS NOTE ADULT - SUBJECTIVE AND OBJECTIVE BOX
Patient is a 58y old  Female who presents with a chief complaint of Influenza A (27 Feb 2018 18:15)    pt seen in icu [  ], reg med floor [ x  ], bed [  ], chair at bedside [ x  ], a+o x3 [ x ], lethargic [  ],  nad [ x ]      Allergies    No Known Allergies        Vitals    T(F): 99.7 (03-01-18 @ 09:06), Max: 99.7 (03-01-18 @ 09:06)  HR: 71 (03-01-18 @ 09:06) (69 - 72)  BP: 117/71 (03-01-18 @ 09:06) (117/71 - 127/68)  RR: 16 (03-01-18 @ 09:06) (16 - 19)  SpO2: 94% (03-01-18 @ 09:06) (94% - 99%)  Wt(kg): --  CAPILLARY BLOOD GLUCOSE      POCT Blood Glucose.: 102 mg/dL (28 Feb 2018 11:15)      Labs                          11.4   3.9   )-----------( 140      ( 28 Feb 2018 08:48 )             34.7       02-28    139  |  109<H>  |  5<L>  ----------------------------<  91  3.9   |  23  |  0.60    Ca    8.2<L>      28 Feb 2018 08:48  Phos  2.8     02-28  Mg     2.0     02-28    TPro  7.9  /  Alb  3.6  /  TBili  0.3  /  DBili  x   /  AST  30  /  ALT  48  /  AlkPhos  96  02-27      CARDIAC MARKERS ( 01 Mar 2018 10:08 )  <0.015 ng/mL / x     / 91 U/L / x     / <1.0 ng/mL        .Blood Blood-Peripheral  02-27 @ 23:23   No growth to date.  --  --      .Blood Blood-Peripheral  02-27 @ 23:22   No growth to date.  --  --      .Urine Clean Catch (Midstream)  02-27 @ 17:56   <10,000 CFU/ml  Normal Urogenital delio present  --  --          Radiology Results      Meds    MEDICATIONS  (STANDING):  enoxaparin Injectable 40 milliGRAM(s) SubCutaneous daily  famotidine Injectable 20 milliGRAM(s) IV Push once  melatonin 3 milliGRAM(s) Oral at bedtime  oseltamivir 75 milliGRAM(s) Oral two times a day  pantoprazole    Tablet 40 milliGRAM(s) Oral before breakfast  sodium chloride 0.9%. 1000 milliLiter(s) (75 mL/Hr) IV Continuous <Continuous>  sucralfate suspension 1 Gram(s) Oral daily      MEDICATIONS  (PRN):  acetaminophen   Tablet 650 milliGRAM(s) Oral every 6 hours PRN For Temp greater than 38 C (100.4 F)  acetaminophen   Tablet. 650 milliGRAM(s) Oral every 6 hours PRN Moderate Pain (4 - 6)  aluminum hydroxide/magnesium hydroxide/simethicone Suspension 30 milliLiter(s) Oral every 6 hours PRN Dyspepsia  guaiFENesin   Syrup  (Sugar-Free) 200 milliGRAM(s) Oral every 6 hours PRN Cough  ondansetron Injectable 4 milliGRAM(s) IV Push every 4 hours PRN Nausea and/or Vomiting      Physical Exam    Neuro :  no focal deficits  Respiratory: CTA B/L  CV: RRR, S1S2, no murmurs,   Abdominal: Soft, NT, ND +BS,  Extremities: No edema, + peripheral pulses    ASSESSMENT    sepsis 2nd to flu on adm  Influenza with other respiratory manifestations, other influenza virus identified  acute bronchitis  h/o Gastritis  H/O: hysterectomy  No significant past surgical history      PLAN    cont tamiflu to complete 5 days  cont levaquin 500 mg daily x 7 days  cont tylenol  cont guaifenesin prn  pulm f/u  cont current meds Patient is a 58y old  Female who presents with a chief complaint of Influenza A (27 Feb 2018 18:15)    pt seen in icu [  ], reg med floor [ x  ], bed [  ], chair at bedside [ x  ], a+o x3 [ x ], lethargic [  ],  nad [ x ]    c/o cp and palpitation this am      Allergies    No Known Allergies        Vitals    T(F): 99.7 (03-01-18 @ 09:06), Max: 99.7 (03-01-18 @ 09:06)  HR: 71 (03-01-18 @ 09:06) (69 - 72)  BP: 117/71 (03-01-18 @ 09:06) (117/71 - 127/68)  RR: 16 (03-01-18 @ 09:06) (16 - 19)  SpO2: 94% (03-01-18 @ 09:06) (94% - 99%)  Wt(kg): --  CAPILLARY BLOOD GLUCOSE      POCT Blood Glucose.: 102 mg/dL (28 Feb 2018 11:15)      Labs                          11.4   3.9   )-----------( 140      ( 28 Feb 2018 08:48 )             34.7       02-28    139  |  109<H>  |  5<L>  ----------------------------<  91  3.9   |  23  |  0.60    Ca    8.2<L>      28 Feb 2018 08:48  Phos  2.8     02-28  Mg     2.0     02-28    TPro  7.9  /  Alb  3.6  /  TBili  0.3  /  DBili  x   /  AST  30  /  ALT  48  /  AlkPhos  96  02-27      CARDIAC MARKERS ( 01 Mar 2018 10:08 )  <0.015 ng/mL / x     / 91 U/L / x     / <1.0 ng/mL        .Blood Blood-Peripheral  02-27 @ 23:23   No growth to date.  --  --      .Blood Blood-Peripheral  02-27 @ 23:22   No growth to date.  --  --      .Urine Clean Catch (Midstream)  02-27 @ 17:56   <10,000 CFU/ml  Normal Urogenital delio present  --  --      ekg with nsr @ 68bpm, irbbb, tinv 3,v5,v6      Radiology Results      Meds    MEDICATIONS  (STANDING):  enoxaparin Injectable 40 milliGRAM(s) SubCutaneous daily  famotidine Injectable 20 milliGRAM(s) IV Push once  melatonin 3 milliGRAM(s) Oral at bedtime  oseltamivir 75 milliGRAM(s) Oral two times a day  pantoprazole    Tablet 40 milliGRAM(s) Oral before breakfast  sodium chloride 0.9%. 1000 milliLiter(s) (75 mL/Hr) IV Continuous <Continuous>  sucralfate suspension 1 Gram(s) Oral daily      MEDICATIONS  (PRN):  acetaminophen   Tablet 650 milliGRAM(s) Oral every 6 hours PRN For Temp greater than 38 C (100.4 F)  acetaminophen   Tablet. 650 milliGRAM(s) Oral every 6 hours PRN Moderate Pain (4 - 6)  aluminum hydroxide/magnesium hydroxide/simethicone Suspension 30 milliLiter(s) Oral every 6 hours PRN Dyspepsia  guaiFENesin   Syrup  (Sugar-Free) 200 milliGRAM(s) Oral every 6 hours PRN Cough  ondansetron Injectable 4 milliGRAM(s) IV Push every 4 hours PRN Nausea and/or Vomiting      Physical Exam    Neuro :  no focal deficits  Respiratory: CTA B/L  CV: RRR, S1S2, no murmurs,   Abdominal: Soft, NT, ND +BS,  Extremities: No edema, + peripheral pulses    ASSESSMENT    chest pain, palp, ekg changes  r/o acs  sepsis 2nd to flu on adm  Influenza with other respiratory manifestations, other influenza virus identified  acute bronchitis  h/o Gastritis  H/O: hysterectomy  No significant past surgical history      PLAN    transfer to tele  start acs protocol  asa and statin  check ce q8 x 1/3 neg  cardio cons  f/u tsh, ft4, lipid panel  cont tamiflu to complete 5 days  cont levaquin 500 mg daily x 7 days  cont tylenol  cont guaifenesin prn  pulm f/u  cont current meds

## 2018-03-01 NOTE — CHART NOTE - NSCHARTNOTEFT_GEN_A_CORE
S= pt with complaints of chest pain  HPI:  Stacie Ricks is a 58F with PMHx gastritis, PUD who presented to ED c/o generalized weakness, fever that started yesterday. Patient refers taking care of grandchildren who were sick for the past week. Patient symptoms are accompanied by dry cough, sore throat, rhinorrhea, nausea and arthralgia. Patient was not vaccinated for influenza. Denies ear pain, pleuritic chest pain, diarrhea.  Refers taking Mucinex w/o improvement of symptoms.       ED course:   Vitals were: BP :131/78 mmHg, HR: 103 bpm, RR: 18 rpm, SaO2: 95% RA, T: 102.2 F  RVP: Influenza A  s/p code sepsis: 1L NS bolus + Levaquin 500 mg IVPB once + Tamiflu 75 mg + Zofran 4mg  Patient initially wanted to leave ED, but became nauseous and vomited x1. (27 Feb 2018 18:1    PE: pt seen standing and in no distress, complaining of chest pain especially on breathing  Heent: acceptable  CV: S1S2 RRR, no murmurs  Lungs: good air entry  GI: soft and non tender  : voids freely  EXT: no c/c/e                        11.4   3.9   )-----------( 140      ( 28 Feb 2018 08:48 )             34.7   Basic Metabolic Panel (02.28.18 @ 08:48)    Sodium, Serum: 139 mmol/L    Potassium, Serum: 3.9 mmol/L    Chloride, Serum: 109 mmol/L    Carbon Dioxide, Serum: 23 mmol/L    Anion Gap, Serum: 7 mmol/L    Blood Urea Nitrogen, Serum: 5 mg/dL    Creatinine, Serum: 0.60 mg/dL    Glucose, Serum: 91 mg/dL    Calcium, Total Serum: 8.2 mg/dL   EKG: sinus rhythm, inverted t waves in the lateral leads , incomplete right BBB  Troponin I, Serum: <0.015:   A/P 58 years old female admitted for influenza , now with complaints of chest pain and ekg changes  Spoke with Dr. Go and requested to transfer to telemmetry  start aspirin and lipitor  Cardiology evaluation.

## 2018-03-02 LAB — TROPONIN I SERPL-MCNC: <0.015 NG/ML — SIGNIFICANT CHANGE UP (ref 0–0.04)

## 2018-03-02 PROCEDURE — 71275 CT ANGIOGRAPHY CHEST: CPT | Mod: 26

## 2018-03-02 RX ORDER — GABAPENTIN 400 MG/1
100 CAPSULE ORAL THREE TIMES A DAY
Qty: 0 | Refills: 0 | Status: DISCONTINUED | OUTPATIENT
Start: 2018-03-02 | End: 2018-03-05

## 2018-03-02 RX ORDER — GABAPENTIN 400 MG/1
100 CAPSULE ORAL ONCE
Qty: 0 | Refills: 0 | Status: COMPLETED | OUTPATIENT
Start: 2018-03-02 | End: 2018-03-02

## 2018-03-02 RX ADMIN — Medication 1 GRAM(S): at 17:01

## 2018-03-02 RX ADMIN — GABAPENTIN 100 MILLIGRAM(S): 400 CAPSULE ORAL at 22:29

## 2018-03-02 RX ADMIN — Medication 75 MILLIGRAM(S): at 17:00

## 2018-03-02 RX ADMIN — GABAPENTIN 100 MILLIGRAM(S): 400 CAPSULE ORAL at 14:02

## 2018-03-02 RX ADMIN — Medication 1 GRAM(S): at 14:02

## 2018-03-02 RX ADMIN — GABAPENTIN 100 MILLIGRAM(S): 400 CAPSULE ORAL at 03:24

## 2018-03-02 RX ADMIN — GABAPENTIN 100 MILLIGRAM(S): 400 CAPSULE ORAL at 06:12

## 2018-03-02 RX ADMIN — Medication 75 MILLIGRAM(S): at 06:02

## 2018-03-02 RX ADMIN — Medication 1 GRAM(S): at 06:02

## 2018-03-02 RX ADMIN — Medication 30 MILLILITER(S): at 22:50

## 2018-03-02 RX ADMIN — ATORVASTATIN CALCIUM 10 MILLIGRAM(S): 80 TABLET, FILM COATED ORAL at 22:29

## 2018-03-02 RX ADMIN — ENOXAPARIN SODIUM 40 MILLIGRAM(S): 100 INJECTION SUBCUTANEOUS at 11:51

## 2018-03-02 RX ADMIN — PANTOPRAZOLE SODIUM 40 MILLIGRAM(S): 20 TABLET, DELAYED RELEASE ORAL at 06:02

## 2018-03-02 RX ADMIN — Medication 1 GRAM(S): at 23:50

## 2018-03-02 RX ADMIN — Medication 81 MILLIGRAM(S): at 11:51

## 2018-03-02 NOTE — PROGRESS NOTE ADULT - SUBJECTIVE AND OBJECTIVE BOX
CHIEF COMPLAINT:Patient is a 58y old  Female who presents with a chief complaint of Influenza.Pt still having chest pain.    	  REVIEW OF SYSTEMS:  CONSTITUTIONAL: No fever, weight loss, or fatigue  EYES: No eye pain, visual disturbances, or discharge  ENT:  No difficulty hearing, tinnitus, vertigo; No sinus or throat pain  NECK: No pain or stiffness  RESPIRATORY: No cough, wheezing, chills or hemoptysis; No Shortness of Breath  CARDIOVASCULAR: + chest pain,No palpitations, passing out, dizziness, or leg swelling  GASTROINTESTINAL: No abdominal or epigastric pain. No nausea, vomiting, or hematemesis; No diarrhea or constipation. No melena or hematochezia.  GENITOURINARY: No dysuria, frequency, hematuria, or incontinence  NEUROLOGICAL: No headaches, memory loss, loss of strength, numbness, or tremors  SKIN: No itching, burning, rashes, or lesions   LYMPH Nodes: No enlarged glands  ENDOCRINE: No heat or cold intolerance; No hair loss  MUSCULOSKELETAL: No joint pain or swelling; No muscle, back, or extremity pain  PSYCHIATRIC: No depression, anxiety, mood swings, or difficulty sleeping  HEME/LYMPH: No easy bruising, or bleeding gums  ALLERGY AND IMMUNOLOGIC: No hives or eczema	      PHYSICAL EXAM:  T(C): 36.3 (03-02-18 @ 07:51), Max: 37 (03-01-18 @ 21:25)  HR: 89 (03-02-18 @ 07:51) (65 - 89)  BP: 135/72 (03-02-18 @ 07:51) (116/73 - 135/72)  RR: 16 (03-02-18 @ 07:51) (16 - 17)  SpO2: 97% (03-02-18 @ 07:51) (96% - 97%)    I&O's Summary    02 Mar 2018 07:01  -  02 Mar 2018 10:38  --------------------------------------------------------  IN: 200 mL / OUT: 0 mL / NET: 200 mL        Appearance: Normal	  HEENT:   Normal oral mucosa, PERRL, EOMI	  Lymphatic: No lymphadenopathy  Cardiovascular: Normal S1 S2, No JVD, No murmurs, No edema  Respiratory: Lungs clear to auscultation	  Psychiatry: A & O x 3, Mood & affect appropriate  Gastrointestinal:  Soft, Non-tender, + BS	  Skin: No rashes, No ecchymoses, No cyanosis	  Neurologic: Non-focal  Extremities: Normal range of motion, No clubbing, cyanosis or edema  Vascular: Peripheral pulses palpable 2+ bilaterally    MEDICATIONS  (STANDING):  aspirin  chewable 81 milliGRAM(s) Oral daily  atorvastatin 10 milliGRAM(s) Oral at bedtime  enoxaparin Injectable 40 milliGRAM(s) SubCutaneous daily  gabapentin 100 milliGRAM(s) Oral three times a day  levoFLOXacin  Tablet 500 milliGRAM(s) Oral every 24 hours  melatonin 3 milliGRAM(s) Oral at bedtime  oseltamivir 75 milliGRAM(s) Oral two times a day  pantoprazole    Tablet 40 milliGRAM(s) Oral before breakfast  sodium chloride 0.9%. 1000 milliLiter(s) (75 mL/Hr) IV Continuous <Continuous>  sucralfate suspension 1 Gram(s) Oral four times a day      TELEMETRY: 	  NSR  	    	  LABS:	 	    CARDIAC MARKERS:  CARDIAC MARKERS ( 02 Mar 2018 03:21 )  <0.015 ng/mL / x     / x     / x     / x      CARDIAC MARKERS ( 01 Mar 2018 21:46 )  <0.015 ng/mL / x     / x     / x     / x      CARDIAC MARKERS ( 01 Mar 2018 16:02 )  <0.015 ng/mL / x     / x     / x     / x      CARDIAC MARKERS ( 01 Mar 2018 10:08 )  <0.015 ng/mL / x     / 91 U/L / x     / <1.0 ng/mL

## 2018-03-02 NOTE — PROGRESS NOTE ADULT - PROBLEM SELECTOR PLAN 4
IMPROVE VTE Individual Risk Assessment        RISK                                                          Points  [ x ] Lower limb paralysis                                   2   [ x ] Immobilization > 24 hrs                              1  IMPROVE VTE Score: 3, DVT PPx w/ Lovenox

## 2018-03-02 NOTE — PROGRESS NOTE ADULT - PROBLEM SELECTOR PLAN 2
S/p code sepsis in ED: 1L NS bolus + Levaquin 500mg IVPB + Tamiflu 75 mg  - UA negative, BCx and UCx NGTD  - CXR: devoid of consolidations or effusion   - Continue with Tamiflu 75 mg BID x 5 days

## 2018-03-02 NOTE — PROGRESS NOTE ADULT - SUBJECTIVE AND OBJECTIVE BOX
PGY 1 Note discussed with supervising resident and primary attending    Patient is a 58y old  Female who presents with a chief complaint of Influenza A (27 Feb 2018 18:15)      INTERVAL HPI/OVERNIGHT EVENTS: Patient seen and examined at bedside with no new complaints    MEDICATIONS  (STANDING):  aspirin  chewable 81 milliGRAM(s) Oral daily  atorvastatin 10 milliGRAM(s) Oral at bedtime  enoxaparin Injectable 40 milliGRAM(s) SubCutaneous daily  gabapentin 100 milliGRAM(s) Oral three times a day  levoFLOXacin  Tablet 500 milliGRAM(s) Oral every 24 hours  melatonin 3 milliGRAM(s) Oral at bedtime  oseltamivir 75 milliGRAM(s) Oral two times a day  pantoprazole    Tablet 40 milliGRAM(s) Oral before breakfast  sodium chloride 0.9%. 1000 milliLiter(s) (75 mL/Hr) IV Continuous <Continuous>  sucralfate suspension 1 Gram(s) Oral four times a day    MEDICATIONS  (PRN):  acetaminophen   Tablet 650 milliGRAM(s) Oral every 6 hours PRN For Temp greater than 38 C (100.4 F)  acetaminophen   Tablet. 650 milliGRAM(s) Oral every 6 hours PRN Moderate Pain (4 - 6)  aluminum hydroxide/magnesium hydroxide/simethicone Suspension 30 milliLiter(s) Oral every 6 hours PRN Dyspepsia  guaiFENesin   Syrup  (Sugar-Free) 200 milliGRAM(s) Oral every 6 hours PRN Cough  ondansetron Injectable 4 milliGRAM(s) IV Push every 4 hours PRN Nausea and/or Vomiting      __________________________________________________  REVIEW OF SYSTEMS:    CONSTITUTIONAL: No fever,   EYES: No acute visual disturbances  NECK: No pain or stiffness  RESPIRATORY: No cough; No shortness of breath  CARDIOVASCULAR: No chest pain, no palpitations  GASTROINTESTINAL: No pain. No nausea or vomiting; No diarrhea   NEUROLOGICAL: No headache or numbness, no tremors  MUSCULOSKELETAL: No joint pain, no muscle pain  GENITOURINARY: No dysuria, no frequency, no hesitancy  PSYCHIATRY: No depression , no anxiety  ALL OTHER  ROS negative        Vital Signs Last 24 Hrs  T(C): 36.4 (02 Mar 2018 04:58), Max: 37.6 (01 Mar 2018 09:06)  T(F): 97.6 (02 Mar 2018 04:58), Max: 99.7 (01 Mar 2018 09:06)  HR: 65 (02 Mar 2018 04:58) (65 - 73)  BP: 120/71 (02 Mar 2018 04:58) (116/73 - 126/74)  BP(mean): --  RR: 17 (02 Mar 2018 04:58) (16 - 17)  SpO2: 97% (02 Mar 2018 04:58) (94% - 97%)    ________________________________________________  PHYSICAL EXAM:  GENERAL: NAD  HEENT: Normocephalic;  conjunctivae and sclerae clear; moist mucous membranes;   NECK : supple  CHEST/LUNG: Clear to auscultation bilaterally with good air entry   HEART: S1 S2  regular; no murmurs, gallops or rubs  ABDOMEN: Soft, Nontender, Nondistended; Bowel sounds present  EXTREMITIES: No cyanosis; no edema; no calf tenderness  NERVOUS SYSTEM:  Awake and alert; Oriented  to place, person and time ; no new deficits    _________________________________________________  LABS:                        11.4   3.9   )-----------( 140      ( 28 Feb 2018 08:48 )             34.7     02-28    139  |  109<H>  |  5<L>  ----------------------------<  91  3.9   |  23  |  0.60    Ca    8.2<L>      28 Feb 2018 08:48  Phos  2.8     02-28  Mg     2.0     02-28          CAPILLARY BLOOD GLUCOSE            RADIOLOGY & ADDITIONAL TESTS:    Imaging Personally Reviewed:  YES    Consultant(s) Notes Reviewed:   YES    Care Discussed with Consultants : YES    Plan of care was discussed with patient and /or primary care giver; all questions and concerns were addressed and care was aligned with patient's wishes. Strong peripheral pulses

## 2018-03-02 NOTE — PROGRESS NOTE ADULT - SUBJECTIVE AND OBJECTIVE BOX
Patient is a 58y old  Female who presents with a chief complaint of Influenza A (27 Feb 2018 18:15)  Awake, alert, comfortable OOB to chair in NAD.    INTERVAL HPI/OVERNIGHT EVENTS:      VITAL SIGNS:  T(F): 98.6 (03-02-18 @ 11:13)  HR: 81 (03-02-18 @ 11:13)  BP: 122/74 (03-02-18 @ 11:13)  RR: 16 (03-02-18 @ 11:13)  SpO2: 95% (03-02-18 @ 11:13)  Wt(kg): --  I&O's Detail    02 Mar 2018 07:01  -  02 Mar 2018 14:05  --------------------------------------------------------  IN:    Oral Fluid: 200 mL  Total IN: 200 mL    OUT:  Total OUT: 0 mL    Total NET: 200 mL              REVIEW OF SYSTEMS:    CONSTITUTIONAL:  No fevers, chills, sweats    HEENT:  Eyes:  No diplopia or blurred vision. ENT:  No earache, sore throat or runny nose.    CARDIOVASCULAR:  No pressure, squeezing, tightness, or heaviness about the chest; no palpitations.    RESPIRATORY:  Per HPI    GASTROINTESTINAL:  No abdominal pain, nausea, vomiting or diarrhea.    GENITOURINARY:  No dysuria, frequency or urgency.    NEUROLOGIC:  No paresthesias, fasciculations, seizures or weakness.    PSYCHIATRIC:  No disorder of thought or mood.      PHYSICAL EXAM:    Constitutional: Well developed and nourished  Eyes:Perrla  ENMT: normal  Neck:supple  Respiratory: good air entry  Cardiovascular: S1 S2 regular  Gastrointestinal: Soft, Non tender  Extremities: No edema  Vascular:normal  Neurological:Awake, alert,Ox3  Musculoskeletal:Normal      MEDICATIONS  (STANDING):  aspirin  chewable 81 milliGRAM(s) Oral daily  atorvastatin 10 milliGRAM(s) Oral at bedtime  enoxaparin Injectable 40 milliGRAM(s) SubCutaneous daily  gabapentin 100 milliGRAM(s) Oral three times a day  levoFLOXacin  Tablet 500 milliGRAM(s) Oral every 24 hours  melatonin 3 milliGRAM(s) Oral at bedtime  oseltamivir 75 milliGRAM(s) Oral two times a day  pantoprazole    Tablet 40 milliGRAM(s) Oral before breakfast  sodium chloride 0.9%. 1000 milliLiter(s) (75 mL/Hr) IV Continuous <Continuous>  sucralfate suspension 1 Gram(s) Oral four times a day    MEDICATIONS  (PRN):  acetaminophen   Tablet 650 milliGRAM(s) Oral every 6 hours PRN For Temp greater than 38 C (100.4 F)  acetaminophen   Tablet. 650 milliGRAM(s) Oral every 6 hours PRN Moderate Pain (4 - 6)  aluminum hydroxide/magnesium hydroxide/simethicone Suspension 30 milliLiter(s) Oral every 6 hours PRN Dyspepsia  guaiFENesin   Syrup  (Sugar-Free) 200 milliGRAM(s) Oral every 6 hours PRN Cough  ondansetron Injectable 4 milliGRAM(s) IV Push every 4 hours PRN Nausea and/or Vomiting      Allergies    No Known Allergies    Intolerances        LABS:                CARDIAC MARKERS ( 02 Mar 2018 03:21 )  <0.015 ng/mL / x     / x     / x     / x      CARDIAC MARKERS ( 01 Mar 2018 21:46 )  <0.015 ng/mL / x     / x     / x     / x      CARDIAC MARKERS ( 01 Mar 2018 16:02 )  <0.015 ng/mL / x     / x     / x     / x      CARDIAC MARKERS ( 01 Mar 2018 10:08 )  <0.015 ng/mL / x     / 91 U/L / x     / <1.0 ng/mL      CAPILLARY BLOOD GLUCOSE            RADIOLOGY & ADDITIONAL TESTS:    CXR:    Ct scan chest:    ekg;    echo:

## 2018-03-02 NOTE — PROGRESS NOTE ADULT - SUBJECTIVE AND OBJECTIVE BOX
Patient is a 58y old  Female who presents with a chief complaint of Influenza A (27 Feb 2018 18:15)    pt seen in tele [x  ], reg med floor [  ], bed [  ], chair at bedside [ x  ], a+o x3 [ x ], lethargic [  ],  nad [ x ]      Allergies    No Known Allergies        Vitals    T(F): 97.4 (03-02-18 @ 07:51), Max: 98.6 (03-01-18 @ 21:25)  HR: 89 (03-02-18 @ 07:51) (65 - 89)  BP: 135/72 (03-02-18 @ 07:51) (116/73 - 135/72)  RR: 16 (03-02-18 @ 07:51) (16 - 17)  SpO2: 97% (03-02-18 @ 07:51) (96% - 97%)  Wt(kg): --  CAPILLARY BLOOD GLUCOSE      POCT Blood Glucose.: 102 mg/dL (28 Feb 2018 11:15)      Labs                CARDIAC MARKERS ( 02 Mar 2018 03:21 )  <0.015 ng/mL / x     / x     / x     / x      CARDIAC MARKERS ( 01 Mar 2018 21:46 )  <0.015 ng/mL / x     / x     / x     / x      CARDIAC MARKERS ( 01 Mar 2018 16:02 )  <0.015 ng/mL / x     / x     / x     / x      CARDIAC MARKERS ( 01 Mar 2018 10:08 )  <0.015 ng/mL / x     / 91 U/L / x     / <1.0 ng/mL        .Blood Blood-Peripheral  02-27 @ 23:23   No growth to date.  --  --      .Blood Blood-Peripheral  02-27 @ 23:22   No growth to date.  --  --      .Urine Clean Catch (Midstream)  02-27 @ 17:56   <10,000 CFU/ml  Normal Urogenital delio present  --  --          Radiology Results      Meds    MEDICATIONS  (STANDING):  aspirin  chewable 81 milliGRAM(s) Oral daily  atorvastatin 10 milliGRAM(s) Oral at bedtime  enoxaparin Injectable 40 milliGRAM(s) SubCutaneous daily  gabapentin 100 milliGRAM(s) Oral three times a day  levoFLOXacin  Tablet 500 milliGRAM(s) Oral every 24 hours  melatonin 3 milliGRAM(s) Oral at bedtime  oseltamivir 75 milliGRAM(s) Oral two times a day  pantoprazole    Tablet 40 milliGRAM(s) Oral before breakfast  sodium chloride 0.9%. 1000 milliLiter(s) (75 mL/Hr) IV Continuous <Continuous>  sucralfate suspension 1 Gram(s) Oral four times a day      MEDICATIONS  (PRN):  acetaminophen   Tablet 650 milliGRAM(s) Oral every 6 hours PRN For Temp greater than 38 C (100.4 F)  acetaminophen   Tablet. 650 milliGRAM(s) Oral every 6 hours PRN Moderate Pain (4 - 6)  aluminum hydroxide/magnesium hydroxide/simethicone Suspension 30 milliLiter(s) Oral every 6 hours PRN Dyspepsia  guaiFENesin   Syrup  (Sugar-Free) 200 milliGRAM(s) Oral every 6 hours PRN Cough  ondansetron Injectable 4 milliGRAM(s) IV Push every 4 hours PRN Nausea and/or Vomiting      Physical Exam    Neuro :  no focal deficits  Respiratory: CTA B/L  CV: RRR, S1S2, no murmurs,   Abdominal: Soft, NT, ND +BS,  Extremities: No edema, + peripheral pulses    ASSESSMENT    chest pain, palp, ekg changes  r/o acs  sepsis 2nd to flu on adm  Influenza with other respiratory manifestations, other influenza virus identified  acute bronchitis  h/o Gastritis  H/O: hysterectomy  No significant past surgical history      PLAN    transfer to tele  start acs protocol  asa and statin  ce q8 x 4 neg noted above  f/u echo  f/u cta to r/o pe  cardio f/u  f/u tsh, ft4, lipid panel  cont tamiflu to complete 5 days  cont levaquin 500 mg daily x 7 days  cont tylenol  cont guaifenesin prn  pulm f/u  cont current meds

## 2018-03-03 DIAGNOSIS — J18.9 PNEUMONIA, UNSPECIFIED ORGANISM: ICD-10-CM

## 2018-03-03 LAB
ANION GAP SERPL CALC-SCNC: 9 MMOL/L — SIGNIFICANT CHANGE UP (ref 5–17)
BUN SERPL-MCNC: 8 MG/DL — SIGNIFICANT CHANGE UP (ref 7–18)
CALCIUM SERPL-MCNC: 9 MG/DL — SIGNIFICANT CHANGE UP (ref 8.4–10.5)
CHLORIDE SERPL-SCNC: 105 MMOL/L — SIGNIFICANT CHANGE UP (ref 96–108)
CO2 SERPL-SCNC: 27 MMOL/L — SIGNIFICANT CHANGE UP (ref 22–31)
CREAT SERPL-MCNC: 0.65 MG/DL — SIGNIFICANT CHANGE UP (ref 0.5–1.3)
EOSINOPHIL NFR BLD AUTO: 3 % — SIGNIFICANT CHANGE UP (ref 0–6)
GLUCOSE SERPL-MCNC: 95 MG/DL — SIGNIFICANT CHANGE UP (ref 70–99)
HCT VFR BLD CALC: 37 % — SIGNIFICANT CHANGE UP (ref 34.5–45)
HGB BLD-MCNC: 12.4 G/DL — SIGNIFICANT CHANGE UP (ref 11.5–15.5)
LYMPHOCYTES # BLD AUTO: 58 % — HIGH (ref 13–44)
MCHC RBC-ENTMCNC: 30.4 PG — SIGNIFICANT CHANGE UP (ref 27–34)
MCHC RBC-ENTMCNC: 33.6 GM/DL — SIGNIFICANT CHANGE UP (ref 32–36)
MCV RBC AUTO: 90.6 FL — SIGNIFICANT CHANGE UP (ref 80–100)
MONOCYTES NFR BLD AUTO: 11 % — SIGNIFICANT CHANGE UP (ref 2–14)
NEUTROPHILS NFR BLD AUTO: 21 % — LOW (ref 43–77)
PLATELET # BLD AUTO: 161 K/UL — SIGNIFICANT CHANGE UP (ref 150–400)
POTASSIUM SERPL-MCNC: 3.8 MMOL/L — SIGNIFICANT CHANGE UP (ref 3.5–5.3)
POTASSIUM SERPL-SCNC: 3.8 MMOL/L — SIGNIFICANT CHANGE UP (ref 3.5–5.3)
RBC # BLD: 4.08 M/UL — SIGNIFICANT CHANGE UP (ref 3.8–5.2)
RBC # FLD: 12.3 % — SIGNIFICANT CHANGE UP (ref 10.3–14.5)
SODIUM SERPL-SCNC: 141 MMOL/L — SIGNIFICANT CHANGE UP (ref 135–145)
WBC # BLD: 3.6 K/UL — LOW (ref 3.8–10.5)
WBC # FLD AUTO: 3.6 K/UL — LOW (ref 3.8–10.5)

## 2018-03-03 RX ADMIN — PANTOPRAZOLE SODIUM 40 MILLIGRAM(S): 20 TABLET, DELAYED RELEASE ORAL at 05:29

## 2018-03-03 RX ADMIN — GABAPENTIN 100 MILLIGRAM(S): 400 CAPSULE ORAL at 14:38

## 2018-03-03 RX ADMIN — Medication 75 MILLIGRAM(S): at 05:29

## 2018-03-03 RX ADMIN — GABAPENTIN 100 MILLIGRAM(S): 400 CAPSULE ORAL at 21:43

## 2018-03-03 RX ADMIN — Medication 1 GRAM(S): at 12:36

## 2018-03-03 RX ADMIN — Medication 1 GRAM(S): at 21:43

## 2018-03-03 RX ADMIN — Medication 1 GRAM(S): at 17:09

## 2018-03-03 RX ADMIN — ATORVASTATIN CALCIUM 10 MILLIGRAM(S): 80 TABLET, FILM COATED ORAL at 21:43

## 2018-03-03 RX ADMIN — ENOXAPARIN SODIUM 40 MILLIGRAM(S): 100 INJECTION SUBCUTANEOUS at 12:36

## 2018-03-03 RX ADMIN — Medication 81 MILLIGRAM(S): at 12:36

## 2018-03-03 RX ADMIN — Medication 1 GRAM(S): at 05:30

## 2018-03-03 RX ADMIN — GABAPENTIN 100 MILLIGRAM(S): 400 CAPSULE ORAL at 05:29

## 2018-03-03 RX ADMIN — Medication 75 MILLIGRAM(S): at 17:10

## 2018-03-03 RX ADMIN — Medication 3 MILLIGRAM(S): at 21:43

## 2018-03-03 NOTE — PROGRESS NOTE ADULT - SUBJECTIVE AND OBJECTIVE BOX
Patient is a 58y old  Female who presents with a chief complaint of Influenza A (27 Feb 2018 18:15)    pt seen in tele [x  ], reg med floor [  ], bed [  ], chair at bedside [ x  ], a+o x3 [ x ], lethargic [  ],  nad [ x ]      Allergies    No Known Allergies        Vitals    T(F): 97.9 (03-03-18 @ 11:56), Max: 98.2 (03-02-18 @ 19:39)  HR: 85 (03-03-18 @ 11:56) (69 - 85)  BP: 112/75 (03-03-18 @ 11:56) (112/75 - 127/74)  RR: 18 (03-03-18 @ 11:56) (15 - 18)  SpO2: 97% (03-03-18 @ 11:56) (95% - 99%)  Wt(kg): --  CAPILLARY BLOOD GLUCOSE          Labs                          12.4   3.6   )-----------( 161      ( 03 Mar 2018 07:03 )             37.0       03-03    141  |  105  |  8   ----------------------------<  95  3.8   |  27  |  0.65    Ca    9.0      03 Mar 2018 07:03        CARDIAC MARKERS ( 02 Mar 2018 03:21 )  <0.015 ng/mL / x     / x     / x     / x      CARDIAC MARKERS ( 01 Mar 2018 21:46 )  <0.015 ng/mL / x     / x     / x     / x      CARDIAC MARKERS ( 01 Mar 2018 16:02 )  <0.015 ng/mL / x     / x     / x     / x            .Blood Blood-Peripheral  02-27 @ 23:23   No growth to date.  --  --      .Blood Blood-Peripheral  02-27 @ 23:22   No growth to date.  --  --      .Urine Clean Catch (Midstream)  02-27 @ 17:56   <10,000 CFU/ml  Normal Urogenital delio present  --  --    < from: Transthoracic Echocardiogram (03.02.18 @ 07:24) >  CONCLUSIONS:  1. Mitral annular calcification. Mild mitral regurgitation.    2. Normal trileaflet aortic valve.  3. Aortic Root: 3.8 cm.  4. Normal left atrium.  5. Normal left ventricular internal dimensions and wall  thicknesses.  6. Normal Left Ventricular Systolic Function,  (EF = 55 to  60%)  7. Grade II diastolic dysfunction.    < end of copied text >        Radiology Results    < from: CT Angio Chest w/ IV Cont (03.02.18 @ 15:59) >  IMPRESSION:     No pulmonary embolism.    Left lower lobe pneumonia. Follow-up to resolution recommended.    < end of copied text >        Meds    MEDICATIONS  (STANDING):  aspirin  chewable 81 milliGRAM(s) Oral daily  atorvastatin 10 milliGRAM(s) Oral at bedtime  enoxaparin Injectable 40 milliGRAM(s) SubCutaneous daily  gabapentin 100 milliGRAM(s) Oral three times a day  levoFLOXacin  Tablet 500 milliGRAM(s) Oral every 24 hours  melatonin 3 milliGRAM(s) Oral at bedtime  oseltamivir 75 milliGRAM(s) Oral two times a day  pantoprazole    Tablet 40 milliGRAM(s) Oral before breakfast  sodium chloride 0.9%. 1000 milliLiter(s) (75 mL/Hr) IV Continuous <Continuous>  sucralfate suspension 1 Gram(s) Oral four times a day      MEDICATIONS  (PRN):  acetaminophen   Tablet 650 milliGRAM(s) Oral every 6 hours PRN For Temp greater than 38 C (100.4 F)  acetaminophen   Tablet. 650 milliGRAM(s) Oral every 6 hours PRN Moderate Pain (4 - 6)  guaiFENesin   Syrup  (Sugar-Free) 200 milliGRAM(s) Oral every 6 hours PRN Cough  ondansetron Injectable 4 milliGRAM(s) IV Push every 4 hours PRN Nausea and/or Vomiting      Physical Exam    Neuro :  no focal deficits  Respiratory: CTA B/L  CV: RRR, S1S2, no murmurs,   Abdominal: Soft, NT, ND +BS,  Extremities: No edema, + peripheral pulses    ASSESSMENT    chest pain, palp, ekg changes  r/o acs  sepsis 2nd to flu on adm  Influenza with other respiratory manifestations, other influenza virus identified  left lower lobe pna  h/o Gastritis  H/O: hysterectomy  No significant past surgical history      PLAN    transfer to [facility]  start acs protocol  asa and statin  ce q8 x 4 neg noted above  echo with EF = 55 to60%. Grade II diastolic dysfunction.  cta neg for pe but positive for left lower lobe pna noted above  cardio f/u noted  f/u stress trest on monday  f/u tsh, ft4, lipid panel  cont tamiflu to complete 5 days  cont levaquin 500 mg daily x 7 days  cont tylenol  cont guaifenesin prn  pulm f/u noted  cont current meds

## 2018-03-03 NOTE — PROGRESS NOTE ADULT - SUBJECTIVE AND OBJECTIVE BOX
PGY 1 Note discussed with supervising resident and primary attending    Patient is a 58y old  Female who presents with a chief complaint of Influenza A (27 Feb 2018 18:15)      INTERVAL HPI/OVERNIGHT EVENTS: Patient seen and examined at bedside with no new complaints - no acute events, pending stress test Monday    MEDICATIONS  (STANDING):  aspirin  chewable 81 milliGRAM(s) Oral daily  atorvastatin 10 milliGRAM(s) Oral at bedtime  enoxaparin Injectable 40 milliGRAM(s) SubCutaneous daily  gabapentin 100 milliGRAM(s) Oral three times a day  levoFLOXacin  Tablet 500 milliGRAM(s) Oral every 24 hours  melatonin 3 milliGRAM(s) Oral at bedtime  oseltamivir 75 milliGRAM(s) Oral two times a day  pantoprazole    Tablet 40 milliGRAM(s) Oral before breakfast  sodium chloride 0.9%. 1000 milliLiter(s) (75 mL/Hr) IV Continuous <Continuous>  sucralfate suspension 1 Gram(s) Oral four times a day    MEDICATIONS  (PRN):  acetaminophen   Tablet 650 milliGRAM(s) Oral every 6 hours PRN For Temp greater than 38 C (100.4 F)  acetaminophen   Tablet. 650 milliGRAM(s) Oral every 6 hours PRN Moderate Pain (4 - 6)  guaiFENesin   Syrup  (Sugar-Free) 200 milliGRAM(s) Oral every 6 hours PRN Cough  ondansetron Injectable 4 milliGRAM(s) IV Push every 4 hours PRN Nausea and/or Vomiting      __________________________________________________  REVIEW OF SYSTEMS:    CONSTITUTIONAL: No fever,   EYES: No acute visual disturbances  NECK: No pain or stiffness  RESPIRATORY: No cough; No shortness of breath  CARDIOVASCULAR: No chest pain, no palpitations  GASTROINTESTINAL: No pain. No nausea or vomiting; No diarrhea   NEUROLOGICAL: No headache or numbness, no tremors  MUSCULOSKELETAL: No joint pain, no muscle pain  GENITOURINARY: No dysuria, no frequency, no hesitancy  PSYCHIATRY: No depression , no anxiety  ALL OTHER  ROS negative        Vital Signs Last 24 Hrs  T(C): 37.2 (03 Mar 2018 19:50), Max: 37.2 (03 Mar 2018 19:50)  T(F): 98.9 (03 Mar 2018 19:50), Max: 98.9 (03 Mar 2018 19:50)  HR: 76 (03 Mar 2018 19:50) (69 - 85)  BP: 122/73 (03 Mar 2018 19:50) (112/75 - 123/71)  BP(mean): --  RR: 18 (03 Mar 2018 19:50) (15 - 18)  SpO2: 96% (03 Mar 2018 19:50) (96% - 99%)    ________________________________________________  PHYSICAL EXAM:  GENERAL: NAD  HEENT: Normocephalic;  conjunctivae and sclerae clear; moist mucous membranes;   NECK : supple  CHEST/LUNG: Clear to auscultation bilaterally with good air entry   HEART: S1 S2  regular; no murmurs, gallops or rubs  ABDOMEN: Soft, Nontender, Nondistended; Bowel sounds present  EXTREMITIES: No cyanosis; no edema; no calf tenderness  NERVOUS SYSTEM:  Awake and alert; Oriented  to place, person and time ; no new deficits    _________________________________________________  LABS:                        12.4   3.6   )-----------( 161      ( 03 Mar 2018 07:03 )             37.0     03-03    141  |  105  |  8   ----------------------------<  95  3.8   |  27  |  0.65    Ca    9.0      03 Mar 2018 07:03          CAPILLARY BLOOD GLUCOSE            RADIOLOGY & ADDITIONAL TESTS:    Imaging Personally Reviewed:  YES    Consultant(s) Notes Reviewed:   YES    Care Discussed with Consultants : YES    Plan of care was discussed with patient and /or primary care giver; all questions and concerns were addressed and care was aligned with patient's wishes.

## 2018-03-03 NOTE — PROGRESS NOTE ADULT - PROBLEM SELECTOR PLAN 2
S/p code sepsis in ED: 1L NS bolus + Levaquin 500mg IVPB + Tamiflu 75 mg  - UA negative, BCx and UCx NGTD  - CXR: devoid of consolidations or effusion  ***CTA showed LLL PNA; c/w Levaquin Day 3  - Continue with Tamiflu 75 mg BID x 5 days

## 2018-03-03 NOTE — PROGRESS NOTE ADULT - SUBJECTIVE AND OBJECTIVE BOX
Patient is a 58y old  Female who presents with a chief complaint of Influenza A (27 Feb 2018 18:15)  Pt alert, awake, oriented x3, resting in bed in NAD.    INTERVAL HPI/OVERNIGHT EVENTS:      VITAL SIGNS:  T(F): 97.8 (03-03-18 @ 07:52)  HR: 69 (03-03-18 @ 07:52)  BP: 123/71 (03-03-18 @ 07:52)  RR: 15 (03-03-18 @ 07:52)  SpO2: 97% (03-03-18 @ 07:52)  Wt(kg): --  I&O's Detail    02 Mar 2018 07:01  -  03 Mar 2018 07:00  --------------------------------------------------------  IN:    Oral Fluid: 200 mL  Total IN: 200 mL    OUT:  Total OUT: 0 mL    Total NET: 200 mL              REVIEW OF SYSTEMS:    CONSTITUTIONAL:  No fevers, chills, sweats    HEENT:  Eyes:  No diplopia or blurred vision. ENT:  No earache, sore throat or runny nose.    CARDIOVASCULAR:  No pressure, squeezing, tightness, or heaviness about the chest; no palpitations.    RESPIRATORY:  Per HPI    GASTROINTESTINAL:  No abdominal pain, nausea, vomiting or diarrhea.    GENITOURINARY:  No dysuria, frequency or urgency.    NEUROLOGIC:  No paresthesias, fasciculations, seizures or weakness.    PSYCHIATRIC:  No disorder of thought or mood.      PHYSICAL EXAM:    Constitutional: Well developed and nourished  Eyes:Perrla  ENMT: normal  Neck:supple  Respiratory: good air entry  Cardiovascular: S1 S2 regular  Gastrointestinal: Soft, Non tender  Extremities: No edema  Vascular:normal  Neurological:Awake, alert,Ox3  Musculoskeletal:Normal      MEDICATIONS  (STANDING):  aspirin  chewable 81 milliGRAM(s) Oral daily  atorvastatin 10 milliGRAM(s) Oral at bedtime  enoxaparin Injectable 40 milliGRAM(s) SubCutaneous daily  gabapentin 100 milliGRAM(s) Oral three times a day  levoFLOXacin  Tablet 500 milliGRAM(s) Oral every 24 hours  melatonin 3 milliGRAM(s) Oral at bedtime  oseltamivir 75 milliGRAM(s) Oral two times a day  pantoprazole    Tablet 40 milliGRAM(s) Oral before breakfast  sodium chloride 0.9%. 1000 milliLiter(s) (75 mL/Hr) IV Continuous <Continuous>  sucralfate suspension 1 Gram(s) Oral four times a day    MEDICATIONS  (PRN):  acetaminophen   Tablet 650 milliGRAM(s) Oral every 6 hours PRN For Temp greater than 38 C (100.4 F)  acetaminophen   Tablet. 650 milliGRAM(s) Oral every 6 hours PRN Moderate Pain (4 - 6)  guaiFENesin   Syrup  (Sugar-Free) 200 milliGRAM(s) Oral every 6 hours PRN Cough  ondansetron Injectable 4 milliGRAM(s) IV Push every 4 hours PRN Nausea and/or Vomiting      Allergies    No Known Allergies    Intolerances        LABS:                        12.4   3.6   )-----------( 161      ( 03 Mar 2018 07:03 )             37.0     03-03    141  |  105  |  8   ----------------------------<  95  3.8   |  27  |  0.65    Ca    9.0      03 Mar 2018 07:03            CARDIAC MARKERS ( 02 Mar 2018 03:21 )  <0.015 ng/mL / x     / x     / x     / x      CARDIAC MARKERS ( 01 Mar 2018 21:46 )  <0.015 ng/mL / x     / x     / x     / x      CARDIAC MARKERS ( 01 Mar 2018 16:02 )  <0.015 ng/mL / x     / x     / x     / x      CARDIAC MARKERS ( 01 Mar 2018 10:08 )  <0.015 ng/mL / x     / 91 U/L / x     / <1.0 ng/mL      CAPILLARY BLOOD GLUCOSE            RADIOLOGY & ADDITIONAL TESTS:    CXR:  < from: Xray Chest 1 View AP/PA (02.27.18 @ 12:38) >  FINDINGS:There is no focal consolidation or pleural effusion.  Heart size   cannot be accurately evaluated in this projection. The osseous structures   are intact.    IMPRESSION: No focal consolidation or pleural effusion.       < end of copied text >    Ct scan chest:    ekg;    echo: Patient is a 58y old  Female who presents with a chief complaint of Influenza A (27 Feb 2018 18:15)  Pt alert, awake, oriented x3, resting comfortable in bed in NAD.    INTERVAL HPI/OVERNIGHT EVENTS:      VITAL SIGNS:  T(F): 97.8 (03-03-18 @ 07:52)  HR: 69 (03-03-18 @ 07:52)  BP: 123/71 (03-03-18 @ 07:52)  RR: 15 (03-03-18 @ 07:52)  SpO2: 97% (03-03-18 @ 07:52)  Wt(kg): --  I&O's Detail    02 Mar 2018 07:01  -  03 Mar 2018 07:00  --------------------------------------------------------  IN:    Oral Fluid: 200 mL  Total IN: 200 mL    OUT:  Total OUT: 0 mL    Total NET: 200 mL              REVIEW OF SYSTEMS:    CONSTITUTIONAL:  No fevers, chills, sweats    HEENT:  Eyes:  No diplopia or blurred vision. ENT:  No earache, sore throat or runny nose.    CARDIOVASCULAR:  No pressure, squeezing, tightness, or heaviness about the chest; no palpitations.    RESPIRATORY:  Per HPI    GASTROINTESTINAL:  No abdominal pain, nausea, vomiting or diarrhea.    GENITOURINARY:  No dysuria, frequency or urgency.    NEUROLOGIC:  No paresthesias, fasciculations, seizures or weakness.    PSYCHIATRIC:  No disorder of thought or mood.      PHYSICAL EXAM:    Constitutional: Well developed and nourished  Eyes:Perrla  ENMT: normal  Neck:supple  Respiratory: good air entry  Cardiovascular: S1 S2 regular  Gastrointestinal: Soft, Non tender  Extremities: No edema  Vascular:normal  Neurological:Awake, alert,Ox3  Musculoskeletal:Normal      MEDICATIONS  (STANDING):  aspirin  chewable 81 milliGRAM(s) Oral daily  atorvastatin 10 milliGRAM(s) Oral at bedtime  enoxaparin Injectable 40 milliGRAM(s) SubCutaneous daily  gabapentin 100 milliGRAM(s) Oral three times a day  levoFLOXacin  Tablet 500 milliGRAM(s) Oral every 24 hours  melatonin 3 milliGRAM(s) Oral at bedtime  oseltamivir 75 milliGRAM(s) Oral two times a day  pantoprazole    Tablet 40 milliGRAM(s) Oral before breakfast  sodium chloride 0.9%. 1000 milliLiter(s) (75 mL/Hr) IV Continuous <Continuous>  sucralfate suspension 1 Gram(s) Oral four times a day    MEDICATIONS  (PRN):  acetaminophen   Tablet 650 milliGRAM(s) Oral every 6 hours PRN For Temp greater than 38 C (100.4 F)  acetaminophen   Tablet. 650 milliGRAM(s) Oral every 6 hours PRN Moderate Pain (4 - 6)  guaiFENesin   Syrup  (Sugar-Free) 200 milliGRAM(s) Oral every 6 hours PRN Cough  ondansetron Injectable 4 milliGRAM(s) IV Push every 4 hours PRN Nausea and/or Vomiting      Allergies    No Known Allergies    Intolerances        LABS:                        12.4   3.6   )-----------( 161      ( 03 Mar 2018 07:03 )             37.0     03-03    141  |  105  |  8   ----------------------------<  95  3.8   |  27  |  0.65    Ca    9.0      03 Mar 2018 07:03            CARDIAC MARKERS ( 02 Mar 2018 03:21 )  <0.015 ng/mL / x     / x     / x     / x      CARDIAC MARKERS ( 01 Mar 2018 21:46 )  <0.015 ng/mL / x     / x     / x     / x      CARDIAC MARKERS ( 01 Mar 2018 16:02 )  <0.015 ng/mL / x     / x     / x     / x      CARDIAC MARKERS ( 01 Mar 2018 10:08 )  <0.015 ng/mL / x     / 91 U/L / x     / <1.0 ng/mL      CAPILLARY BLOOD GLUCOSE            RADIOLOGY & ADDITIONAL TESTS:    CXR:  < from: Xray Chest 1 View AP/PA (02.27.18 @ 12:38) >  FINDINGS:There is no focal consolidation or pleural effusion.  Heart size   cannot be accurately evaluated in this projection. The osseous structures   are intact.    IMPRESSION: No focal consolidation or pleural effusion.       < end of copied text >    Ct scan chest:    ekg;    echo: Patient is a 58y old  Female who presents with a chief complaint of Influenza A (27 Feb 2018 18:15)  Pt alert, awake, oriented x3, resting comfortable in bed in NAD.    INTERVAL HPI/OVERNIGHT EVENTS:      VITAL SIGNS:  T(F): 97.8 (03-03-18 @ 07:52)  HR: 69 (03-03-18 @ 07:52)  BP: 123/71 (03-03-18 @ 07:52)  RR: 15 (03-03-18 @ 07:52)  SpO2: 97% (03-03-18 @ 07:52)  Wt(kg): --  I&O's Detail    02 Mar 2018 07:01  -  03 Mar 2018 07:00  --------------------------------------------------------  IN:    Oral Fluid: 200 mL  Total IN: 200 mL    OUT:  Total OUT: 0 mL    Total NET: 200 mL              REVIEW OF SYSTEMS:    CONSTITUTIONAL:  No fevers, chills, sweats    HEENT:  Eyes:  No diplopia or blurred vision. ENT:  No earache, sore throat or runny nose.    CARDIOVASCULAR:  No pressure, squeezing, tightness, or heaviness about the chest; no palpitations.    RESPIRATORY:  Per HPI    GASTROINTESTINAL:  No abdominal pain, nausea, vomiting or diarrhea.    GENITOURINARY:  No dysuria, frequency or urgency.    NEUROLOGIC:  No paresthesias, fasciculations, seizures or weakness.    PSYCHIATRIC:  No disorder of thought or mood.      PHYSICAL EXAM:    Constitutional: Well developed and nourished  Eyes:Perrla  ENMT: normal  Neck:supple  Respiratory: good air entry  Cardiovascular: S1 S2 regular  Gastrointestinal: Soft, Non tender  Extremities: No edema  Vascular:normal  Neurological:Awake, alert,Ox3  Musculoskeletal:Normal      MEDICATIONS  (STANDING):  aspirin  chewable 81 milliGRAM(s) Oral daily  atorvastatin 10 milliGRAM(s) Oral at bedtime  enoxaparin Injectable 40 milliGRAM(s) SubCutaneous daily  gabapentin 100 milliGRAM(s) Oral three times a day  levoFLOXacin  Tablet 500 milliGRAM(s) Oral every 24 hours  melatonin 3 milliGRAM(s) Oral at bedtime  oseltamivir 75 milliGRAM(s) Oral two times a day  pantoprazole    Tablet 40 milliGRAM(s) Oral before breakfast  sodium chloride 0.9%. 1000 milliLiter(s) (75 mL/Hr) IV Continuous <Continuous>  sucralfate suspension 1 Gram(s) Oral four times a day    MEDICATIONS  (PRN):  acetaminophen   Tablet 650 milliGRAM(s) Oral every 6 hours PRN For Temp greater than 38 C (100.4 F)  acetaminophen   Tablet. 650 milliGRAM(s) Oral every 6 hours PRN Moderate Pain (4 - 6)  guaiFENesin   Syrup  (Sugar-Free) 200 milliGRAM(s) Oral every 6 hours PRN Cough  ondansetron Injectable 4 milliGRAM(s) IV Push every 4 hours PRN Nausea and/or Vomiting      Allergies    No Known Allergies    Intolerances        LABS:                        12.4   3.6   )-----------( 161      ( 03 Mar 2018 07:03 )             37.0     03-03    141  |  105  |  8   ----------------------------<  95  3.8   |  27  |  0.65    Ca    9.0      03 Mar 2018 07:03            CARDIAC MARKERS ( 02 Mar 2018 03:21 )  <0.015 ng/mL / x     / x     / x     / x      CARDIAC MARKERS ( 01 Mar 2018 21:46 )  <0.015 ng/mL / x     / x     / x     / x      CARDIAC MARKERS ( 01 Mar 2018 16:02 )  <0.015 ng/mL / x     / x     / x     / x      CARDIAC MARKERS ( 01 Mar 2018 10:08 )  <0.015 ng/mL / x     / 91 U/L / x     / <1.0 ng/mL      CAPILLARY BLOOD GLUCOSE            RADIOLOGY & ADDITIONAL TESTS:    CXR:  < from: Xray Chest 1 View AP/PA (02.27.18 @ 12:38) >  FINDINGS:There is no focal consolidation or pleural effusion.  Heart size   cannot be accurately evaluated in this projection. The osseous structures   are intact.    IMPRESSION: No focal consolidation or pleural effusion.       < end of copied text >    Ct scan chest:  < from: CT Angio Chest w/ IV Cont (03.02.18 @ 15:59) >  IMPRESSION:     No pulmonary embolism.    Left lower lobe pneumonia. Follow-up to resolution recommended.    < end of copied text >    ekg;    echo:

## 2018-03-04 LAB
ANION GAP SERPL CALC-SCNC: 9 MMOL/L — SIGNIFICANT CHANGE UP (ref 5–17)
BASOPHILS # BLD AUTO: 0 K/UL — SIGNIFICANT CHANGE UP (ref 0–0.2)
BASOPHILS NFR BLD AUTO: 0.5 % — SIGNIFICANT CHANGE UP (ref 0–2)
BUN SERPL-MCNC: 9 MG/DL — SIGNIFICANT CHANGE UP (ref 7–18)
CALCIUM SERPL-MCNC: 8.6 MG/DL — SIGNIFICANT CHANGE UP (ref 8.4–10.5)
CHLORIDE SERPL-SCNC: 108 MMOL/L — SIGNIFICANT CHANGE UP (ref 96–108)
CHOLEST SERPL-MCNC: 151 MG/DL — SIGNIFICANT CHANGE UP (ref 10–199)
CO2 SERPL-SCNC: 25 MMOL/L — SIGNIFICANT CHANGE UP (ref 22–31)
CREAT SERPL-MCNC: 0.55 MG/DL — SIGNIFICANT CHANGE UP (ref 0.5–1.3)
EOSINOPHIL # BLD AUTO: 0.1 K/UL — SIGNIFICANT CHANGE UP (ref 0–0.5)
EOSINOPHIL NFR BLD AUTO: 3.6 % — SIGNIFICANT CHANGE UP (ref 0–6)
GLUCOSE BLDC GLUCOMTR-MCNC: 102 MG/DL — HIGH (ref 70–99)
GLUCOSE SERPL-MCNC: 92 MG/DL — SIGNIFICANT CHANGE UP (ref 70–99)
HCT VFR BLD CALC: 36.5 % — SIGNIFICANT CHANGE UP (ref 34.5–45)
HDLC SERPL-MCNC: 32 MG/DL — LOW (ref 40–125)
HGB BLD-MCNC: 12 G/DL — SIGNIFICANT CHANGE UP (ref 11.5–15.5)
LIPID PNL WITH DIRECT LDL SERPL: 99 MG/DL — SIGNIFICANT CHANGE UP
LYMPHOCYTES # BLD AUTO: 2.4 K/UL — SIGNIFICANT CHANGE UP (ref 1–3.3)
LYMPHOCYTES # BLD AUTO: 60.4 % — HIGH (ref 13–44)
MCHC RBC-ENTMCNC: 29.8 PG — SIGNIFICANT CHANGE UP (ref 27–34)
MCHC RBC-ENTMCNC: 32.9 GM/DL — SIGNIFICANT CHANGE UP (ref 32–36)
MCV RBC AUTO: 90.4 FL — SIGNIFICANT CHANGE UP (ref 80–100)
MONOCYTES # BLD AUTO: 0.4 K/UL — SIGNIFICANT CHANGE UP (ref 0–0.9)
MONOCYTES NFR BLD AUTO: 10.5 % — SIGNIFICANT CHANGE UP (ref 2–14)
NEUTROPHILS # BLD AUTO: 1 K/UL — LOW (ref 1.8–7.4)
NEUTROPHILS NFR BLD AUTO: 25 % — LOW (ref 43–77)
PLATELET # BLD AUTO: 150 K/UL — SIGNIFICANT CHANGE UP (ref 150–400)
POTASSIUM SERPL-MCNC: 3.5 MMOL/L — SIGNIFICANT CHANGE UP (ref 3.5–5.3)
POTASSIUM SERPL-SCNC: 3.5 MMOL/L — SIGNIFICANT CHANGE UP (ref 3.5–5.3)
RBC # BLD: 4.04 M/UL — SIGNIFICANT CHANGE UP (ref 3.8–5.2)
RBC # FLD: 12 % — SIGNIFICANT CHANGE UP (ref 10.3–14.5)
SODIUM SERPL-SCNC: 142 MMOL/L — SIGNIFICANT CHANGE UP (ref 135–145)
T4 FREE SERPL-MCNC: 1.3 NG/DL — SIGNIFICANT CHANGE UP (ref 0.9–1.8)
TOTAL CHOLESTEROL/HDL RATIO MEASUREMENT: 4.7 RATIO — SIGNIFICANT CHANGE UP (ref 3.3–7.1)
TRIGL SERPL-MCNC: 99 MG/DL — SIGNIFICANT CHANGE UP (ref 10–149)
TSH SERPL-MCNC: 1.48 UU/ML — SIGNIFICANT CHANGE UP (ref 0.34–4.82)
WBC # BLD: 3.9 K/UL — SIGNIFICANT CHANGE UP (ref 3.8–10.5)
WBC # FLD AUTO: 3.9 K/UL — SIGNIFICANT CHANGE UP (ref 3.8–10.5)

## 2018-03-04 RX ADMIN — Medication 1 GRAM(S): at 05:11

## 2018-03-04 RX ADMIN — Medication 75 MILLIGRAM(S): at 05:11

## 2018-03-04 RX ADMIN — Medication 81 MILLIGRAM(S): at 11:58

## 2018-03-04 RX ADMIN — GABAPENTIN 100 MILLIGRAM(S): 400 CAPSULE ORAL at 05:11

## 2018-03-04 RX ADMIN — Medication 1 GRAM(S): at 23:05

## 2018-03-04 RX ADMIN — Medication 1 GRAM(S): at 17:40

## 2018-03-04 RX ADMIN — ATORVASTATIN CALCIUM 10 MILLIGRAM(S): 80 TABLET, FILM COATED ORAL at 21:17

## 2018-03-04 RX ADMIN — PANTOPRAZOLE SODIUM 40 MILLIGRAM(S): 20 TABLET, DELAYED RELEASE ORAL at 05:11

## 2018-03-04 RX ADMIN — Medication 1 GRAM(S): at 11:58

## 2018-03-04 RX ADMIN — GABAPENTIN 100 MILLIGRAM(S): 400 CAPSULE ORAL at 14:40

## 2018-03-04 RX ADMIN — ENOXAPARIN SODIUM 40 MILLIGRAM(S): 100 INJECTION SUBCUTANEOUS at 11:58

## 2018-03-04 RX ADMIN — Medication 3 MILLIGRAM(S): at 21:17

## 2018-03-04 RX ADMIN — GABAPENTIN 100 MILLIGRAM(S): 400 CAPSULE ORAL at 21:17

## 2018-03-04 NOTE — PROGRESS NOTE ADULT - SUBJECTIVE AND OBJECTIVE BOX
CHIEF COMPLAINT:Patient is a 58y old  Female who presents with a chief complaint of Influenza A. Pt appears comfortable.    	  REVIEW OF SYSTEMS:  CONSTITUTIONAL: No fever, weight loss, or fatigue  EYES: No eye pain, visual disturbances, or discharge  ENT:  No difficulty hearing, tinnitus, vertigo; No sinus or throat pain  NECK: No pain or stiffness  RESPIRATORY: No cough, wheezing, chills or hemoptysis; No Shortness of Breath  CARDIOVASCULAR: No chest pain, palpitations, passing out, dizziness, or leg swelling  GASTROINTESTINAL: No abdominal or epigastric pain. No nausea, vomiting, or hematemesis; No diarrhea or constipation. No melena or hematochezia.  GENITOURINARY: No dysuria, frequency, hematuria, or incontinence  NEUROLOGICAL: No headaches, memory loss, loss of strength, numbness, or tremors  SKIN: No itching, burning, rashes, or lesions   LYMPH Nodes: No enlarged glands  ENDOCRINE: No heat or cold intolerance; No hair loss  MUSCULOSKELETAL: No joint pain or swelling; No muscle, back, or extremity pain  PSYCHIATRIC: No depression, anxiety, mood swings, or difficulty sleeping  HEME/LYMPH: No easy bruising, or bleeding gums  ALLERGY AND IMMUNOLOGIC: No hives or eczema	      PHYSICAL EXAM:  T(C): 36.8 (03-04-18 @ 07:49), Max: 37.3 (03-04-18 @ 04:52)  HR: 73 (03-04-18 @ 07:49) (68 - 85)  BP: 115/57 (03-04-18 @ 07:49) (109/59 - 122/73)  RR: 17 (03-04-18 @ 07:49) (17 - 18)  SpO2: 97% (03-04-18 @ 07:49) (96% - 98%)  Wt(kg): --  I&O's Summary    03 Mar 2018 07:01  -  04 Mar 2018 07:00  --------------------------------------------------------  IN: 118 mL / OUT: 0 mL / NET: 118 mL        Appearance: Normal	  HEENT:   Normal oral mucosa, PERRL, EOMI	  Lymphatic: No lymphadenopathy  Cardiovascular: Normal S1 S2, No JVD, No murmurs, No edema  Respiratory: Lungs clear to auscultation	  Psychiatry: A & O x 3, Mood & affect appropriate  Gastrointestinal:  Soft, Non-tender, + BS	  Skin: No rashes, No ecchymoses, No cyanosis	  Neurologic: Non-focal  Extremities: Normal range of motion, No clubbing, cyanosis or edema  Vascular: Peripheral pulses palpable 2+ bilaterally    MEDICATIONS  (STANDING):  aspirin  chewable 81 milliGRAM(s) Oral daily  atorvastatin 10 milliGRAM(s) Oral at bedtime  enoxaparin Injectable 40 milliGRAM(s) SubCutaneous daily  gabapentin 100 milliGRAM(s) Oral three times a day  levoFLOXacin  Tablet 500 milliGRAM(s) Oral every 24 hours  melatonin 3 milliGRAM(s) Oral at bedtime  pantoprazole    Tablet 40 milliGRAM(s) Oral before breakfast  sodium chloride 0.9%. 1000 milliLiter(s) (75 mL/Hr) IV Continuous <Continuous>  sucralfate suspension 1 Gram(s) Oral four times a day      	  LABS:	 	                        12.4   3.6   )-----------( 161      ( 03 Mar 2018 07:03 )             37.0     03-03    141  |  105  |  8   ----------------------------<  95  3.8   |  27  |  0.65    Ca    9.0      03 Mar 2018 07:03

## 2018-03-04 NOTE — PROGRESS NOTE ADULT - SUBJECTIVE AND OBJECTIVE BOX
Patient is a 58y old  Female who presents with a chief complaint of Influenza A (27 Feb 2018 18:15)  Pt alert, awake, oriented x3, resting comfortable in bed in NAD.    INTERVAL HPI/OVERNIGHT EVENTS:      VITAL SIGNS:  T(F): 98.2 (03-04-18 @ 07:49)  HR: 73 (03-04-18 @ 07:49)  BP: 115/57 (03-04-18 @ 07:49)  RR: 17 (03-04-18 @ 07:49)  SpO2: 97% (03-04-18 @ 07:49)  Wt(kg): --  I&O's Detail    03 Mar 2018 07:01  -  04 Mar 2018 07:00  --------------------------------------------------------  IN:    Oral Fluid: 118 mL  Total IN: 118 mL    OUT:  Total OUT: 0 mL    Total NET: 118 mL              REVIEW OF SYSTEMS:    CONSTITUTIONAL:  No fevers, chills, sweats    HEENT:  Eyes:  No diplopia or blurred vision. ENT:  No earache, sore throat or runny nose.    CARDIOVASCULAR:  No pressure, squeezing, tightness, or heaviness about the chest; no palpitations.    RESPIRATORY:  Per HPI    GASTROINTESTINAL:  No abdominal pain, nausea, vomiting or diarrhea.    GENITOURINARY:  No dysuria, frequency or urgency.    NEUROLOGIC:  No paresthesias, fasciculations, seizures or weakness.    PSYCHIATRIC:  No disorder of thought or mood.      PHYSICAL EXAM:    Constitutional: Well developed and nourished  Eyes:Perrla  ENMT: normal  Neck:supple  Respiratory: good air entry  Cardiovascular: S1 S2 regular  Gastrointestinal: Soft, Non tender  Extremities: No edema  Vascular:normal  Neurological:Awake, alert,Ox3  Musculoskeletal:Normal      MEDICATIONS  (STANDING):  aspirin  chewable 81 milliGRAM(s) Oral daily  atorvastatin 10 milliGRAM(s) Oral at bedtime  enoxaparin Injectable 40 milliGRAM(s) SubCutaneous daily  gabapentin 100 milliGRAM(s) Oral three times a day  levoFLOXacin  Tablet 500 milliGRAM(s) Oral every 24 hours  melatonin 3 milliGRAM(s) Oral at bedtime  pantoprazole    Tablet 40 milliGRAM(s) Oral before breakfast  sodium chloride 0.9%. 1000 milliLiter(s) (75 mL/Hr) IV Continuous <Continuous>  sucralfate suspension 1 Gram(s) Oral four times a day    MEDICATIONS  (PRN):  acetaminophen   Tablet 650 milliGRAM(s) Oral every 6 hours PRN For Temp greater than 38 C (100.4 F)  acetaminophen   Tablet. 650 milliGRAM(s) Oral every 6 hours PRN Moderate Pain (4 - 6)  guaiFENesin   Syrup  (Sugar-Free) 200 milliGRAM(s) Oral every 6 hours PRN Cough  ondansetron Injectable 4 milliGRAM(s) IV Push every 4 hours PRN Nausea and/or Vomiting      Allergies    No Known Allergies    Intolerances        LABS:                        12.0   3.9   )-----------( 150      ( 04 Mar 2018 08:15 )             36.5     03-04    142  |  108  |  9   ----------------------------<  92  3.5   |  25  |  0.55    Ca    8.6      04 Mar 2018 08:15                CAPILLARY BLOOD GLUCOSE            RADIOLOGY & ADDITIONAL TESTS:    CXR:  < from: Xray Chest 1 View AP/PA (02.27.18 @ 12:38) >  FINDINGS:There is no focal consolidation or pleural effusion.  Heart size   cannot be accurately evaluated in this projection. The osseous structures   are intact.    IMPRESSION: No focal consolidation or pleural effusion.         < end of copied text >    Ct scan chest:  < from: CT Angio Chest w/ IV Cont (03.02.18 @ 15:59) >  CHEST:     LUNGS AND LARGE AIRWAYS: Patent central airways. There is a mixed   groundglass and dense consolidation in the left lower lobe with   associated nodular and tree-in-bud opacities. Right middle lobe linear   atelectasis.  PLEURA: No pleural effusion.  VESSELS: No pulmonary embolism. Minimal atherosclerotic calcifications of   the aorta.  HEART: Heart size is normal. Coronary artery calcifications. No   pericardial effusion.  MEDIASTINUM AND DEBBIE: No lymphadenopathy.  CHEST WALL AND LOWER NECK: Within normal limits.  VISUALIZED UPPER ABDOMEN: Within normal limits.  BONES: Degenerative changes of the spine.    IMPRESSION:     No pulmonary embolism.    Left lower lobe pneumonia. Follow-up to resolution recommended.      < end of copied text >    ekg;    echo: Patient is a 58y old  Female who presents with a chief complaint of Influenza A (27 Feb 2018 18:15)  Pt alert, awake, oriented x3, resting comfortable out of bed in NAD. For stress test in am    INTERVAL HPI/OVERNIGHT EVENTS:      VITAL SIGNS:  T(F): 98.2 (03-04-18 @ 07:49)  HR: 73 (03-04-18 @ 07:49)  BP: 115/57 (03-04-18 @ 07:49)  RR: 17 (03-04-18 @ 07:49)  SpO2: 97% (03-04-18 @ 07:49)  Wt(kg): --  I&O's Detail    03 Mar 2018 07:01  -  04 Mar 2018 07:00  --------------------------------------------------------  IN:    Oral Fluid: 118 mL  Total IN: 118 mL    OUT:  Total OUT: 0 mL    Total NET: 118 mL              REVIEW OF SYSTEMS:    CONSTITUTIONAL:  No fevers, chills, sweats    HEENT:  Eyes:  No diplopia or blurred vision. ENT:  No earache, sore throat or runny nose.    CARDIOVASCULAR:  No pressure, squeezing, tightness, or heaviness about the chest; no palpitations.    RESPIRATORY:  Per HPI    GASTROINTESTINAL:  No abdominal pain, nausea, vomiting or diarrhea.    GENITOURINARY:  No dysuria, frequency or urgency.    NEUROLOGIC:  No paresthesias, fasciculations, seizures or weakness.    PSYCHIATRIC:  No disorder of thought or mood.      PHYSICAL EXAM:    Constitutional: Well developed and nourished  Eyes:Perrla  ENMT: normal  Neck:supple  Respiratory: good air entry  Cardiovascular: S1 S2 regular  Gastrointestinal: Soft, Non tender  Extremities: No edema  Vascular:normal  Neurological:Awake, alert,Ox3  Musculoskeletal:Normal      MEDICATIONS  (STANDING):  aspirin  chewable 81 milliGRAM(s) Oral daily  atorvastatin 10 milliGRAM(s) Oral at bedtime  enoxaparin Injectable 40 milliGRAM(s) SubCutaneous daily  gabapentin 100 milliGRAM(s) Oral three times a day  levoFLOXacin  Tablet 500 milliGRAM(s) Oral every 24 hours  melatonin 3 milliGRAM(s) Oral at bedtime  pantoprazole    Tablet 40 milliGRAM(s) Oral before breakfast  sodium chloride 0.9%. 1000 milliLiter(s) (75 mL/Hr) IV Continuous <Continuous>  sucralfate suspension 1 Gram(s) Oral four times a day    MEDICATIONS  (PRN):  acetaminophen   Tablet 650 milliGRAM(s) Oral every 6 hours PRN For Temp greater than 38 C (100.4 F)  acetaminophen   Tablet. 650 milliGRAM(s) Oral every 6 hours PRN Moderate Pain (4 - 6)  guaiFENesin   Syrup  (Sugar-Free) 200 milliGRAM(s) Oral every 6 hours PRN Cough  ondansetron Injectable 4 milliGRAM(s) IV Push every 4 hours PRN Nausea and/or Vomiting      Allergies    No Known Allergies    Intolerances        LABS:                        12.0   3.9   )-----------( 150      ( 04 Mar 2018 08:15 )             36.5     03-04    142  |  108  |  9   ----------------------------<  92  3.5   |  25  |  0.55    Ca    8.6      04 Mar 2018 08:15                CAPILLARY BLOOD GLUCOSE            RADIOLOGY & ADDITIONAL TESTS:    CXR:  < from: Xray Chest 1 View AP/PA (02.27.18 @ 12:38) >  FINDINGS:There is no focal consolidation or pleural effusion.  Heart size   cannot be accurately evaluated in this projection. The osseous structures   are intact.    IMPRESSION: No focal consolidation or pleural effusion.         < end of copied text >    Ct scan chest:  < from: CT Angio Chest w/ IV Cont (03.02.18 @ 15:59) >  CHEST:     LUNGS AND LARGE AIRWAYS: Patent central airways. There is a mixed   groundglass and dense consolidation in the left lower lobe with   associated nodular and tree-in-bud opacities. Right middle lobe linear   atelectasis.  PLEURA: No pleural effusion.  VESSELS: No pulmonary embolism. Minimal atherosclerotic calcifications of   the aorta.  HEART: Heart size is normal. Coronary artery calcifications. No   pericardial effusion.  MEDIASTINUM AND DEBBIE: No lymphadenopathy.  CHEST WALL AND LOWER NECK: Within normal limits.  VISUALIZED UPPER ABDOMEN: Within normal limits.  BONES: Degenerative changes of the spine.    IMPRESSION:     No pulmonary embolism.    Left lower lobe pneumonia. Follow-up to resolution recommended.      < end of copied text >    ekg;    echo:

## 2018-03-04 NOTE — PROGRESS NOTE ADULT - SUBJECTIVE AND OBJECTIVE BOX
Patient is a 58y old  Female who presents with a chief complaint of Influenza A (27 Feb 2018 18:15)    pt seen in tele [x  ], reg med floor [  ], bed [  ], chair at bedside [ x  ], a+o x3 [ x ], lethargic [  ],  nad [ x ]    Allergies    No Known Allergies        Vitals    T(F): 98.2 (03-04-18 @ 07:49), Max: 99.1 (03-04-18 @ 04:52)  HR: 73 (03-04-18 @ 07:49) (68 - 85)  BP: 115/57 (03-04-18 @ 07:49) (109/59 - 122/73)  RR: 17 (03-04-18 @ 07:49) (17 - 18)  SpO2: 97% (03-04-18 @ 07:49) (96% - 98%)  Wt(kg): --  CAPILLARY BLOOD GLUCOSE          Labs                          12.4   3.6   )-----------( 161      ( 03 Mar 2018 07:03 )             37.0       03-03    141  |  105  |  8   ----------------------------<  95  3.8   |  27  |  0.65    Ca    9.0      03 Mar 2018 07:03        .Blood Blood-Peripheral  02-27 @ 23:23   No growth to date.  --  --      .Blood Blood-Peripheral  02-27 @ 23:22   No growth to date.  --  --      .Urine Clean Catch (Midstream)  02-27 @ 17:56   <10,000 CFU/ml  Normal Urogenital delio present  --  --          Radiology Results      Meds    MEDICATIONS  (STANDING):  aspirin  chewable 81 milliGRAM(s) Oral daily  atorvastatin 10 milliGRAM(s) Oral at bedtime  enoxaparin Injectable 40 milliGRAM(s) SubCutaneous daily  gabapentin 100 milliGRAM(s) Oral three times a day  levoFLOXacin  Tablet 500 milliGRAM(s) Oral every 24 hours  melatonin 3 milliGRAM(s) Oral at bedtime  pantoprazole    Tablet 40 milliGRAM(s) Oral before breakfast  sodium chloride 0.9%. 1000 milliLiter(s) (75 mL/Hr) IV Continuous <Continuous>  sucralfate suspension 1 Gram(s) Oral four times a day      MEDICATIONS  (PRN):  acetaminophen   Tablet 650 milliGRAM(s) Oral every 6 hours PRN For Temp greater than 38 C (100.4 F)  acetaminophen   Tablet. 650 milliGRAM(s) Oral every 6 hours PRN Moderate Pain (4 - 6)  guaiFENesin   Syrup  (Sugar-Free) 200 milliGRAM(s) Oral every 6 hours PRN Cough  ondansetron Injectable 4 milliGRAM(s) IV Push every 4 hours PRN Nausea and/or Vomiting      Physical Exam    Neuro :  no focal deficits  Respiratory: CTA B/L  CV: RRR, S1S2, no murmurs,   Abdominal: Soft, NT, ND +BS,  Extremities: No edema, + peripheral pulses    ASSESSMENT    chest pain, palp, ekg changes  r/o acs  sepsis 2nd to flu on adm  Influenza with other respiratory manifestations, other influenza virus identified  left lower lobe pna  h/o Gastritis  H/O: hysterectomy  No significant past surgical history      PLAN    d/c tele  cont asa and statin  ce q8 x 4 neg noted above  echo with EF = 55 to60%. Grade II diastolic dysfunction.  cta neg for pe but positive for left lower lobe pna noted above  cardio f/u noted  f/u stress trest in am  f/u tsh, ft4, lipid panel  cont tamiflu to complete 5 days  cont levaquin 500 mg daily x 7 days  cont tylenol  cont guaifenesin prn  pulm f/u noted  cont current meds

## 2018-03-05 ENCOUNTER — TRANSCRIPTION ENCOUNTER (OUTPATIENT)
Age: 58
End: 2018-03-05

## 2018-03-05 VITALS
DIASTOLIC BLOOD PRESSURE: 66 MMHG | RESPIRATION RATE: 18 BRPM | SYSTOLIC BLOOD PRESSURE: 118 MMHG | HEART RATE: 72 BPM | TEMPERATURE: 98 F | OXYGEN SATURATION: 96 %

## 2018-03-05 LAB
ANION GAP SERPL CALC-SCNC: 10 MMOL/L — SIGNIFICANT CHANGE UP (ref 5–17)
BASOPHILS # BLD AUTO: 0 K/UL — SIGNIFICANT CHANGE UP (ref 0–0.2)
BASOPHILS NFR BLD AUTO: 0.6 % — SIGNIFICANT CHANGE UP (ref 0–2)
BUN SERPL-MCNC: 8 MG/DL — SIGNIFICANT CHANGE UP (ref 7–18)
CALCIUM SERPL-MCNC: 9 MG/DL — SIGNIFICANT CHANGE UP (ref 8.4–10.5)
CHLORIDE SERPL-SCNC: 106 MMOL/L — SIGNIFICANT CHANGE UP (ref 96–108)
CO2 SERPL-SCNC: 26 MMOL/L — SIGNIFICANT CHANGE UP (ref 22–31)
CREAT SERPL-MCNC: 0.58 MG/DL — SIGNIFICANT CHANGE UP (ref 0.5–1.3)
CULTURE RESULTS: SIGNIFICANT CHANGE UP
CULTURE RESULTS: SIGNIFICANT CHANGE UP
EOSINOPHIL # BLD AUTO: 0.1 K/UL — SIGNIFICANT CHANGE UP (ref 0–0.5)
EOSINOPHIL NFR BLD AUTO: 2.7 % — SIGNIFICANT CHANGE UP (ref 0–6)
GLUCOSE SERPL-MCNC: 96 MG/DL — SIGNIFICANT CHANGE UP (ref 70–99)
HCT VFR BLD CALC: 39.7 % — SIGNIFICANT CHANGE UP (ref 34.5–45)
HGB BLD-MCNC: 12.4 G/DL — SIGNIFICANT CHANGE UP (ref 11.5–15.5)
LYMPHOCYTES # BLD AUTO: 2.8 K/UL — SIGNIFICANT CHANGE UP (ref 1–3.3)
LYMPHOCYTES # BLD AUTO: 55.5 % — HIGH (ref 13–44)
MCHC RBC-ENTMCNC: 28.5 PG — SIGNIFICANT CHANGE UP (ref 27–34)
MCHC RBC-ENTMCNC: 31.4 GM/DL — LOW (ref 32–36)
MCV RBC AUTO: 90.8 FL — SIGNIFICANT CHANGE UP (ref 80–100)
MONOCYTES # BLD AUTO: 0.3 K/UL — SIGNIFICANT CHANGE UP (ref 0–0.9)
MONOCYTES NFR BLD AUTO: 5.6 % — SIGNIFICANT CHANGE UP (ref 2–14)
NEUTROPHILS # BLD AUTO: 1.8 K/UL — SIGNIFICANT CHANGE UP (ref 1.8–7.4)
NEUTROPHILS NFR BLD AUTO: 35.5 % — LOW (ref 43–77)
PLATELET # BLD AUTO: 178 K/UL — SIGNIFICANT CHANGE UP (ref 150–400)
POTASSIUM SERPL-MCNC: 3.7 MMOL/L — SIGNIFICANT CHANGE UP (ref 3.5–5.3)
POTASSIUM SERPL-SCNC: 3.7 MMOL/L — SIGNIFICANT CHANGE UP (ref 3.5–5.3)
RBC # BLD: 4.37 M/UL — SIGNIFICANT CHANGE UP (ref 3.8–5.2)
RBC # FLD: 12.5 % — SIGNIFICANT CHANGE UP (ref 10.3–14.5)
SODIUM SERPL-SCNC: 142 MMOL/L — SIGNIFICANT CHANGE UP (ref 135–145)
SPECIMEN SOURCE: SIGNIFICANT CHANGE UP
SPECIMEN SOURCE: SIGNIFICANT CHANGE UP
WBC # BLD: 5 K/UL — SIGNIFICANT CHANGE UP (ref 3.8–10.5)
WBC # FLD AUTO: 5 K/UL — SIGNIFICANT CHANGE UP (ref 3.8–10.5)

## 2018-03-05 PROCEDURE — 81001 URINALYSIS AUTO W/SCOPE: CPT

## 2018-03-05 PROCEDURE — 84484 ASSAY OF TROPONIN QUANT: CPT

## 2018-03-05 PROCEDURE — 87486 CHLMYD PNEUM DNA AMP PROBE: CPT

## 2018-03-05 PROCEDURE — 85027 COMPLETE CBC AUTOMATED: CPT

## 2018-03-05 PROCEDURE — 71275 CT ANGIOGRAPHY CHEST: CPT

## 2018-03-05 PROCEDURE — 80048 BASIC METABOLIC PNL TOTAL CA: CPT

## 2018-03-05 PROCEDURE — 85730 THROMBOPLASTIN TIME PARTIAL: CPT

## 2018-03-05 PROCEDURE — 80061 LIPID PANEL: CPT

## 2018-03-05 PROCEDURE — 85610 PROTHROMBIN TIME: CPT

## 2018-03-05 PROCEDURE — 84443 ASSAY THYROID STIM HORMONE: CPT

## 2018-03-05 PROCEDURE — 87086 URINE CULTURE/COLONY COUNT: CPT

## 2018-03-05 PROCEDURE — 82962 GLUCOSE BLOOD TEST: CPT

## 2018-03-05 PROCEDURE — 71045 X-RAY EXAM CHEST 1 VIEW: CPT

## 2018-03-05 PROCEDURE — 83735 ASSAY OF MAGNESIUM: CPT

## 2018-03-05 PROCEDURE — 99285 EMERGENCY DEPT VISIT HI MDM: CPT | Mod: 25

## 2018-03-05 PROCEDURE — 84100 ASSAY OF PHOSPHORUS: CPT

## 2018-03-05 PROCEDURE — 83605 ASSAY OF LACTIC ACID: CPT

## 2018-03-05 PROCEDURE — 93005 ELECTROCARDIOGRAM TRACING: CPT

## 2018-03-05 PROCEDURE — 78452 HT MUSCLE IMAGE SPECT MULT: CPT

## 2018-03-05 PROCEDURE — 87798 DETECT AGENT NOS DNA AMP: CPT

## 2018-03-05 PROCEDURE — 82553 CREATINE MB FRACTION: CPT

## 2018-03-05 PROCEDURE — 93306 TTE W/DOPPLER COMPLETE: CPT

## 2018-03-05 PROCEDURE — 96374 THER/PROPH/DIAG INJ IV PUSH: CPT

## 2018-03-05 PROCEDURE — 90686 IIV4 VACC NO PRSV 0.5 ML IM: CPT

## 2018-03-05 PROCEDURE — 93017 CV STRESS TEST TRACING ONLY: CPT

## 2018-03-05 PROCEDURE — 82550 ASSAY OF CK (CPK): CPT

## 2018-03-05 PROCEDURE — 84439 ASSAY OF FREE THYROXINE: CPT

## 2018-03-05 PROCEDURE — 87633 RESP VIRUS 12-25 TARGETS: CPT

## 2018-03-05 PROCEDURE — A9502: CPT

## 2018-03-05 PROCEDURE — 87581 M.PNEUMON DNA AMP PROBE: CPT

## 2018-03-05 PROCEDURE — 80053 COMPREHEN METABOLIC PANEL: CPT

## 2018-03-05 PROCEDURE — 87040 BLOOD CULTURE FOR BACTERIA: CPT

## 2018-03-05 RX ORDER — ASPIRIN/CALCIUM CARB/MAGNESIUM 324 MG
1 TABLET ORAL
Qty: 30 | Refills: 0
Start: 2018-03-05 | End: 2018-04-03

## 2018-03-05 RX ORDER — ASPIRIN/CALCIUM CARB/MAGNESIUM 324 MG
1 TABLET ORAL
Qty: 30 | Refills: 0 | OUTPATIENT
Start: 2018-03-05 | End: 2018-04-03

## 2018-03-05 RX ORDER — INFLUENZA VIRUS VACCINE 15; 15; 15; 15 UG/.5ML; UG/.5ML; UG/.5ML; UG/.5ML
0.5 SUSPENSION INTRAMUSCULAR ONCE
Qty: 0 | Refills: 0 | Status: COMPLETED | OUTPATIENT
Start: 2018-03-05 | End: 2018-03-05

## 2018-03-05 RX ADMIN — INFLUENZA VIRUS VACCINE 0.5 MILLILITER(S): 15; 15; 15; 15 SUSPENSION INTRAMUSCULAR at 14:47

## 2018-03-05 RX ADMIN — Medication 81 MILLIGRAM(S): at 13:08

## 2018-03-05 RX ADMIN — GABAPENTIN 100 MILLIGRAM(S): 400 CAPSULE ORAL at 05:49

## 2018-03-05 RX ADMIN — Medication 1 GRAM(S): at 05:49

## 2018-03-05 RX ADMIN — PANTOPRAZOLE SODIUM 40 MILLIGRAM(S): 20 TABLET, DELAYED RELEASE ORAL at 05:49

## 2018-03-05 RX ADMIN — Medication 1 GRAM(S): at 13:08

## 2018-03-05 RX ADMIN — GABAPENTIN 100 MILLIGRAM(S): 400 CAPSULE ORAL at 13:08

## 2018-03-05 NOTE — PROGRESS NOTE ADULT - PROBLEM SELECTOR PLAN 1
cont meds
C/o chest pain and repeat EKG showed TWI at V5 and V6 ao  - CE negative x 3  - No acute events on telemetry  Cardiology Dr. Webb
C/o chest pain and repeat EKG showed new TWI at V5 and V6   - CE negative x 3  - CTA negative for PE  - TTE EF wnls, G2DD  - No acute events on telemetry  Cardiology Dr. Webb  ***Plan for stress test Monday and f/u Lipid panel
C/o chest pain and repeat EKG showed new TWI at V5 and V6   - CE negative x 3  - CTA negative for PE  - TTE EF wnls, G2DD  - No acute events on telemetry  Cardiology Dr. Webb  - Lipid panel wnls  ***Plan for stress test
cont meds

## 2018-03-05 NOTE — PROGRESS NOTE ADULT - SUBJECTIVE AND OBJECTIVE BOX
CHIEF COMPLAINT:Patient is a 58y old  Female who presents with a chief complaint of Influenza A. Pt appears comfortable.    	  REVIEW OF SYSTEMS:  CONSTITUTIONAL: No fever, weight loss, or fatigue  EYES: No eye pain, visual disturbances, or discharge  ENT:  No difficulty hearing, tinnitus, vertigo; No sinus or throat pain  NECK: No pain or stiffness  RESPIRATORY: No cough, wheezing, chills or hemoptysis; No Shortness of Breath  CARDIOVASCULAR: No chest pain, palpitations, passing out, dizziness, or leg swelling  GASTROINTESTINAL: No abdominal or epigastric pain. No nausea, vomiting, or hematemesis; No diarrhea or constipation. No melena or hematochezia.  GENITOURINARY: No dysuria, frequency, hematuria, or incontinence  NEUROLOGICAL: No headaches, memory loss, loss of strength, numbness, or tremors  SKIN: No itching, burning, rashes, or lesions   LYMPH Nodes: No enlarged glands  ENDOCRINE: No heat or cold intolerance; No hair loss  MUSCULOSKELETAL: No joint pain or swelling; No muscle, back, or extremity pain  PSYCHIATRIC: No depression, anxiety, mood swings, or difficulty sleeping  HEME/LYMPH: No easy bruising, or bleeding gums  ALLERGY AND IMMUNOLOGIC: No hives or eczema	      PHYSICAL EXAM:  T(C): 36.9 (03-05-18 @ 07:06), Max: 37.1 (03-04-18 @ 12:05)  HR: 63 (03-05-18 @ 07:06) (60 - 79)  BP: 119/67 (03-05-18 @ 07:06) (103/64 - 124/68)  RR: 18 (03-05-18 @ 07:06) (17 - 18)  SpO2: 96% (03-05-18 @ 07:06) (96% - 100%)    I&O's Summary    04 Mar 2018 07:01  -  05 Mar 2018 07:00  --------------------------------------------------------  IN: 300 mL / OUT: 0 mL / NET: 300 mL        Appearance: Normal	  HEENT:   Normal oral mucosa, PERRL, EOMI	  Lymphatic: No lymphadenopathy  Cardiovascular: Normal S1 S2, No JVD, No murmurs, No edema  Respiratory: Dec BS LT base  Psychiatry: A & O x 3, Mood & affect appropriate  Gastrointestinal:  Soft, Non-tender, + BS	  Skin: No rashes, No ecchymoses, No cyanosis	  Neurologic: Non-focal  Extremities: Normal range of motion, No clubbing, cyanosis or edema  Vascular: Peripheral pulses palpable 2+ bilaterally    MEDICATIONS  (STANDING):  aspirin  chewable 81 milliGRAM(s) Oral daily  atorvastatin 10 milliGRAM(s) Oral at bedtime  enoxaparin Injectable 40 milliGRAM(s) SubCutaneous daily  gabapentin 100 milliGRAM(s) Oral three times a day  levoFLOXacin  Tablet 500 milliGRAM(s) Oral every 24 hours  melatonin 3 milliGRAM(s) Oral at bedtime  pantoprazole    Tablet 40 milliGRAM(s) Oral before breakfast  sodium chloride 0.9%. 1000 milliLiter(s) (75 mL/Hr) IV Continuous <Continuous>  sucralfate suspension 1 Gram(s) Oral four times a day      	  LABS:	 	                        12.0   3.9   )-----------( 150      ( 04 Mar 2018 08:15 )             36.5     03-04    142  |  108  |  9   ----------------------------<  92  3.5   |  25  |  0.55    Ca    8.6      04 Mar 2018 08:15        Lipid Profile: Cholesterol 151  LDL 99  HDL 32  TG 99    TSH: Thyroid Stimulating Hormone, Serum: 1.48 uU/mL (03-04 @ 08:15)

## 2018-03-05 NOTE — DISCHARGE NOTE ADULT - PLAN OF CARE
Complete 2 more days of oral antibiotics The chest x-ray was negative but the CT scan of the lung showed left lower lobe pneumonia and you were treated with 5 days of oral antibiotics - please complete 2 more days and follow up with your primary care physician after completion of therapy. During your stay you had chest pain with new changes shown on the electrocardiogram. Cardiology was consulted and a transthoracic echocardiogram whose ejection fraction is within normal limits. A stress test was done negative for reversible ischemia - no further work up required at this time. On admission you were found to have influenza A and completed therapy and showed clinical improvement.

## 2018-03-05 NOTE — DISCHARGE NOTE ADULT - PATIENT PORTAL LINK FT
You can access the [facility] Patient Portal, offered by [facility], by registering with the following website: [email or web address]

## 2018-03-05 NOTE — PROGRESS NOTE ADULT - SUBJECTIVE AND OBJECTIVE BOX
Patient is a 58y old  Female who presents with a chief complaint of Influenza A.   Pt alert, awake, oriented x3, resting comfortable out of bed in NAD. Follow up stress test that was done.       INTERVAL HPI/OVERNIGHT EVENTS:      VITAL SIGNS:  T(F): 98.4 (03-05-18 @ 07:06)  HR: 63 (03-05-18 @ 07:06)  BP: 119/67 (03-05-18 @ 07:06)  RR: 18 (03-05-18 @ 07:06)  SpO2: 96% (03-05-18 @ 07:06)  Wt(kg): --  I&O's Detail    04 Mar 2018 07:01  -  05 Mar 2018 07:00  --------------------------------------------------------  IN:    Oral Fluid: 300 mL  Total IN: 300 mL    OUT:  Total OUT: 0 mL    Total NET: 300 mL              REVIEW OF SYSTEMS:    CONSTITUTIONAL:  No fevers, chills, sweats    HEENT:  Eyes:  No diplopia or blurred vision. ENT:  No earache, sore throat or runny nose.    CARDIOVASCULAR:  No pressure, squeezing, tightness, or heaviness about the chest; no palpitations.    RESPIRATORY:  Per HPI    GASTROINTESTINAL:  No abdominal pain, nausea, vomiting or diarrhea.    GENITOURINARY:  No dysuria, frequency or urgency.    NEUROLOGIC:  No paresthesias, fasciculations, seizures or weakness.    PSYCHIATRIC:  No disorder of thought or mood.      PHYSICAL EXAM:    Constitutional: Well developed and nourished  Eyes:Perrla  ENMT: normal  Neck:supple  Respiratory: good air entry  Cardiovascular: S1 S2 regular  Gastrointestinal: Soft, Non tender  Extremities: No edema  Vascular:normal  Neurological:Awake, alert,Ox3  Musculoskeletal:Normal      MEDICATIONS  (STANDING):  aspirin  chewable 81 milliGRAM(s) Oral daily  atorvastatin 10 milliGRAM(s) Oral at bedtime  enoxaparin Injectable 40 milliGRAM(s) SubCutaneous daily  gabapentin 100 milliGRAM(s) Oral three times a day  levoFLOXacin  Tablet 500 milliGRAM(s) Oral every 24 hours  melatonin 3 milliGRAM(s) Oral at bedtime  pantoprazole    Tablet 40 milliGRAM(s) Oral before breakfast  sodium chloride 0.9%. 1000 milliLiter(s) (75 mL/Hr) IV Continuous <Continuous>  sucralfate suspension 1 Gram(s) Oral four times a day    MEDICATIONS  (PRN):  acetaminophen   Tablet 650 milliGRAM(s) Oral every 6 hours PRN For Temp greater than 38 C (100.4 F)  acetaminophen   Tablet. 650 milliGRAM(s) Oral every 6 hours PRN Moderate Pain (4 - 6)  guaiFENesin   Syrup  (Sugar-Free) 200 milliGRAM(s) Oral every 6 hours PRN Cough  ondansetron Injectable 4 milliGRAM(s) IV Push every 4 hours PRN Nausea and/or Vomiting      Allergies    No Known Allergies    Intolerances        LABS:                        12.4   5.0   )-----------( 178      ( 05 Mar 2018 07:23 )             39.7     03-05    142  |  106  |  8   ----------------------------<  96  3.7   |  26  |  0.58    Ca    9.0      05 Mar 2018 07:23                CAPILLARY BLOOD GLUCOSE      POCT Blood Glucose.: 102 mg/dL (04 Mar 2018 16:30)        RADIOLOGY & ADDITIONAL TESTS:    CXR:    Ct scan chest:  < from: CT Angio Chest w/ IV Cont (03.02.18 @ 15:59) >  LUNGS AND LARGE AIRWAYS: Patent central airways. There is a mixed   groundglass and dense consolidation in the left lower lobe with   associated nodular and tree-in-bud opacities. Right middle lobe linear   atelectasis.    < end of copied text >    ekg;    echo:  < from: Transthoracic Echocardiogram (03.02.18 @ 07:24) >  CONCLUSIONS:  1. Mitral annular calcification. Mild mitral regurgitation.  2. Normal trileaflet aortic valve.  3. Aortic Root: 3.8 cm.  4. Normal left atrium.  5. Normal left ventricular internal dimensions and wall thicknesses.  6. Normal Left Ventricular Systolic Function,  (EF = 55 to 60%)  7. Grade II diastolic dysfunction.  8. Normal right atrium.  9. Normal right ventricular size and function.  10. There is mild tricuspid regurgitation.  11. Normal pericardium with no pericardial effusion.    < end of copied text > Patient is a 58y old  Female who presents with a chief complaint of Influenza A.   Pt alert, awake, oriented x3, resting comfortable out of bed in NAD. Follow up stress test done today.       INTERVAL HPI/OVERNIGHT EVENTS:      VITAL SIGNS:  T(F): 98.4 (03-05-18 @ 07:06)  HR: 63 (03-05-18 @ 07:06)  BP: 119/67 (03-05-18 @ 07:06)  RR: 18 (03-05-18 @ 07:06)  SpO2: 96% (03-05-18 @ 07:06)  Wt(kg): --  I&O's Detail    04 Mar 2018 07:01  -  05 Mar 2018 07:00  --------------------------------------------------------  IN:    Oral Fluid: 300 mL  Total IN: 300 mL    OUT:  Total OUT: 0 mL    Total NET: 300 mL              REVIEW OF SYSTEMS:    CONSTITUTIONAL:  No fevers, chills, sweats    HEENT:  Eyes:  No diplopia or blurred vision. ENT:  No earache, sore throat or runny nose.    CARDIOVASCULAR:  No pressure, squeezing, tightness, or heaviness about the chest; no palpitations.    RESPIRATORY:  Per HPI    GASTROINTESTINAL:  No abdominal pain, nausea, vomiting or diarrhea.    GENITOURINARY:  No dysuria, frequency or urgency.    NEUROLOGIC:  No paresthesias, fasciculations, seizures or weakness.    PSYCHIATRIC:  No disorder of thought or mood.      PHYSICAL EXAM:    Constitutional: Well developed and nourished  Eyes:Perrla  ENMT: normal  Neck:supple  Respiratory: good air entry  Cardiovascular: S1 S2 regular  Gastrointestinal: Soft, Non tender  Extremities: No edema  Vascular:normal  Neurological:Awake, alert,Ox3  Musculoskeletal:Normal      MEDICATIONS  (STANDING):  aspirin  chewable 81 milliGRAM(s) Oral daily  atorvastatin 10 milliGRAM(s) Oral at bedtime  enoxaparin Injectable 40 milliGRAM(s) SubCutaneous daily  gabapentin 100 milliGRAM(s) Oral three times a day  levoFLOXacin  Tablet 500 milliGRAM(s) Oral every 24 hours  melatonin 3 milliGRAM(s) Oral at bedtime  pantoprazole    Tablet 40 milliGRAM(s) Oral before breakfast  sodium chloride 0.9%. 1000 milliLiter(s) (75 mL/Hr) IV Continuous <Continuous>  sucralfate suspension 1 Gram(s) Oral four times a day    MEDICATIONS  (PRN):  acetaminophen   Tablet 650 milliGRAM(s) Oral every 6 hours PRN For Temp greater than 38 C (100.4 F)  acetaminophen   Tablet. 650 milliGRAM(s) Oral every 6 hours PRN Moderate Pain (4 - 6)  guaiFENesin   Syrup  (Sugar-Free) 200 milliGRAM(s) Oral every 6 hours PRN Cough  ondansetron Injectable 4 milliGRAM(s) IV Push every 4 hours PRN Nausea and/or Vomiting      Allergies    No Known Allergies    Intolerances        LABS:                        12.4   5.0   )-----------( 178      ( 05 Mar 2018 07:23 )             39.7     03-05    142  |  106  |  8   ----------------------------<  96  3.7   |  26  |  0.58    Ca    9.0      05 Mar 2018 07:23                CAPILLARY BLOOD GLUCOSE      POCT Blood Glucose.: 102 mg/dL (04 Mar 2018 16:30)        RADIOLOGY & ADDITIONAL TESTS:    CXR:    Ct scan chest:  < from: CT Angio Chest w/ IV Cont (03.02.18 @ 15:59) >  LUNGS AND LARGE AIRWAYS: Patent central airways. There is a mixed   groundglass and dense consolidation in the left lower lobe with   associated nodular and tree-in-bud opacities. Right middle lobe linear   atelectasis.    < end of copied text >    ekg;    echo:  < from: Transthoracic Echocardiogram (03.02.18 @ 07:24) >  CONCLUSIONS:  1. Mitral annular calcification. Mild mitral regurgitation.  2. Normal trileaflet aortic valve.  3. Aortic Root: 3.8 cm.  4. Normal left atrium.  5. Normal left ventricular internal dimensions and wall thicknesses.  6. Normal Left Ventricular Systolic Function,  (EF = 55 to 60%)  7. Grade II diastolic dysfunction.  8. Normal right atrium.  9. Normal right ventricular size and function.  10. There is mild tricuspid regurgitation.  11. Normal pericardium with no pericardial effusion.    < end of copied text >  < from: Nuclear Stress Test-Pharmacologic (03.05.18 @ 07:51) >    IMPRESSIONS:Normal Study  * Negative ECG evidence of ischemia after IV of Lexiscan.  * Review of raw data shows: The study is of good technical  quality.  * The left ventricle was normal in size. Normal myocardial  perfusion scan, with no evidence of infarction or  inducible ischemia.  * Gated wall motion analysis is performed, and shows  normal wall motion with post stress LVEF of 64%.    < end of copied text >

## 2018-03-05 NOTE — PROGRESS NOTE ADULT - SUBJECTIVE AND OBJECTIVE BOX
Patient is a 58y old  Female who presents with a chief complaint of Influenza A (27 Feb 2018 18:15)    pt seen in tele [x  ], reg med floor [  ], bed [  ], chair at bedside [ x  ], a+o x3 [ x ], lethargic [  ],  nad [ x ]      Allergies    No Known Allergies        Vitals    T(F): 98.4 (03-05-18 @ 07:06), Max: 98.7 (03-04-18 @ 12:05)  HR: 63 (03-05-18 @ 07:06) (60 - 79)  BP: 119/67 (03-05-18 @ 07:06) (103/64 - 124/68)  RR: 18 (03-05-18 @ 07:06) (17 - 18)  SpO2: 96% (03-05-18 @ 07:06) (96% - 100%)  Wt(kg): --  CAPILLARY BLOOD GLUCOSE      POCT Blood Glucose.: 102 mg/dL (04 Mar 2018 16:30)      Labs                          12.0   3.9   )-----------( 150      ( 04 Mar 2018 08:15 )             36.5       03-05    142  |  106  |  8   ----------------------------<  96  3.7   |  26  |  0.58    Ca    9.0      05 Mar 2018 07:23      Free Thyroxine, Serum in AM (03.04.18 @ 11:49)    Free Thyroxine, Serum: 1.3 ng/dL    Thyroid Stimulating Hormone, Serum (03.04.18 @ 08:15)    Thyroid Stimulating Hormone, Serum: 1.48 uU/mL    Lipid Profile in AM (03.04.18 @ 08:15)    Total Cholesterol/HDL Ratio Measurement: 4.7 RATIO    Cholesterol, Serum: 151 mg/dL    Triglycerides, Serum: 99 mg/dL    HDL Cholesterol, Serum: 32 mg/dL    Direct LDL: 99: LDL Cholesterol --- Interpretive Comment (for adults 18 and over)  Optimal LDL Level may vary based on clinical situation  Below 70                  Ideal for people at very high risk of heart  disease  Below 100                Ideal for people at risk of heart disease  100 - 129                   [place]  130 - 159                   Borderline high  160 - 189                   High  190 and Above          Very high mg/dL            .Blood Blood-Peripheral  02-27 @ 23:23   No growth at 5 days.  --  --      .Blood Blood-Peripheral  02-27 @ 23:22   No growth at 5 days.  --  --      .Urine Clean Catch (Midstream)  02-27 @ 17:56   <10,000 CFU/ml  Normal Urogenital delio present  --  --          Radiology Results      Meds    MEDICATIONS  (STANDING):  aspirin  chewable 81 milliGRAM(s) Oral daily  atorvastatin 10 milliGRAM(s) Oral at bedtime  enoxaparin Injectable 40 milliGRAM(s) SubCutaneous daily  gabapentin 100 milliGRAM(s) Oral three times a day  levoFLOXacin  Tablet 500 milliGRAM(s) Oral every 24 hours  melatonin 3 milliGRAM(s) Oral at bedtime  pantoprazole    Tablet 40 milliGRAM(s) Oral before breakfast  sodium chloride 0.9%. 1000 milliLiter(s) (75 mL/Hr) IV Continuous <Continuous>  sucralfate suspension 1 Gram(s) Oral four times a day      MEDICATIONS  (PRN):  acetaminophen   Tablet 650 milliGRAM(s) Oral every 6 hours PRN For Temp greater than 38 C (100.4 F)  acetaminophen   Tablet. 650 milliGRAM(s) Oral every 6 hours PRN Moderate Pain (4 - 6)  guaiFENesin   Syrup  (Sugar-Free) 200 milliGRAM(s) Oral every 6 hours PRN Cough  ondansetron Injectable 4 milliGRAM(s) IV Push every 4 hours PRN Nausea and/or Vomiting      Physical Exam    Neuro :  no focal deficits  Respiratory: CTA B/L  CV: RRR, S1S2, no murmurs,   Abdominal: Soft, NT, ND +BS,  Extremities: No edema, + peripheral pulses    ASSESSMENT    chest pain, palp, ekg changes  r/o acs  sepsis 2nd to flu on adm  Influenza with other respiratory manifestations, other influenza virus identified  left lower lobe pna  h/o Gastritis  H/O: hysterectomy  No significant past surgical history      PLAN    d/c tele  cont asa and statin  ce q8 x 4 neg noted above  echo with EF = 55 to60%. Grade II diastolic dysfunction.  cta neg for pe but positive for left lower lobe pna noted above  cardio f/u noted  f/u stress trest  tsh, ft4, lipid panel wnl noted above  completed tamiflu x 5 days  cont levaquin 500 mg daily x 2 more days  cont tylenol  cont guaifenesin prn  pulm f/u noted  cont current meds  d/c plan if stress neg

## 2018-03-05 NOTE — PROGRESS NOTE ADULT - SUBJECTIVE AND OBJECTIVE BOX
PGY 1 Note discussed with supervising resident and primary attending    Patient is a 58y old  Female who presents with a chief complaint of Influenza A (27 Feb 2018 18:15)      INTERVAL HPI/OVERNIGHT EVENTS: Patient seen and examined at bedside with no new complaints    MEDICATIONS  (STANDING):  aspirin  chewable 81 milliGRAM(s) Oral daily  atorvastatin 10 milliGRAM(s) Oral at bedtime  enoxaparin Injectable 40 milliGRAM(s) SubCutaneous daily  gabapentin 100 milliGRAM(s) Oral three times a day  levoFLOXacin  Tablet 500 milliGRAM(s) Oral every 24 hours  melatonin 3 milliGRAM(s) Oral at bedtime  pantoprazole    Tablet 40 milliGRAM(s) Oral before breakfast  sodium chloride 0.9%. 1000 milliLiter(s) (75 mL/Hr) IV Continuous <Continuous>  sucralfate suspension 1 Gram(s) Oral four times a day    MEDICATIONS  (PRN):  acetaminophen   Tablet 650 milliGRAM(s) Oral every 6 hours PRN For Temp greater than 38 C (100.4 F)  acetaminophen   Tablet. 650 milliGRAM(s) Oral every 6 hours PRN Moderate Pain (4 - 6)  guaiFENesin   Syrup  (Sugar-Free) 200 milliGRAM(s) Oral every 6 hours PRN Cough  ondansetron Injectable 4 milliGRAM(s) IV Push every 4 hours PRN Nausea and/or Vomiting      __________________________________________________  REVIEW OF SYSTEMS:    CONSTITUTIONAL: No fever,   EYES: No acute visual disturbances  NECK: No pain or stiffness  RESPIRATORY: No cough; No shortness of breath  CARDIOVASCULAR: No chest pain, no palpitations  GASTROINTESTINAL: No pain. No nausea or vomiting; No diarrhea   NEUROLOGICAL: No headache or numbness, no tremors  MUSCULOSKELETAL: No joint pain, no muscle pain  GENITOURINARY: No dysuria, no frequency, no hesitancy  PSYCHIATRY: No depression , no anxiety  ALL OTHER  ROS negative        Vital Signs Last 24 Hrs  T(C): 36.7 (05 Mar 2018 04:47), Max: 37.1 (04 Mar 2018 12:05)  T(F): 98 (05 Mar 2018 04:47), Max: 98.7 (04 Mar 2018 12:05)  HR: 76 (05 Mar 2018 04:47) (60 - 79)  BP: 103/64 (05 Mar 2018 04:47) (103/64 - 124/68)  BP(mean): --  RR: 18 (05 Mar 2018 04:47) (17 - 18)  SpO2: 97% (05 Mar 2018 04:47) (96% - 100%)    ________________________________________________  PHYSICAL EXAM:  GENERAL: NAD  HEENT: Normocephalic;  conjunctivae and sclerae clear; moist mucous membranes;   NECK : supple  CHEST/LUNG: Clear to auscultation bilaterally with good air entry   HEART: S1 S2  regular; no murmurs, gallops or rubs  ABDOMEN: Soft, Nontender, Nondistended; Bowel sounds present  EXTREMITIES: No cyanosis; no edema; no calf tenderness  NERVOUS SYSTEM:  Awake and alert; Oriented  to place, person and time ; no new deficits    _________________________________________________  LABS:                        12.0   3.9   )-----------( 150      ( 04 Mar 2018 08:15 )             36.5     03-04    142  |  108  |  9   ----------------------------<  92  3.5   |  25  |  0.55    Ca    8.6      04 Mar 2018 08:15          CAPILLARY BLOOD GLUCOSE      POCT Blood Glucose.: 102 mg/dL (04 Mar 2018 16:30)        RADIOLOGY & ADDITIONAL TESTS:    Imaging Personally Reviewed:  YES    Consultant(s) Notes Reviewed:   YES    Care Discussed with Consultants : YES    Plan of care was discussed with patient and /or primary care giver; all questions and concerns were addressed and care was aligned with patient's wishes.

## 2018-03-05 NOTE — PROGRESS NOTE ADULT - PROBLEM SELECTOR PLAN 3
ACS ruled out  ECHO follow up  ASA, BB, Statin  Follow up stress test  Cardio follow up
ACS ruled out  ECHO  ASA, BB, Statin  Stress test  Cardio follow up
R/o ACS protocol  ECHO  ASA, BB, Statin
Diagnosed 2 years ago in EGD  - C/w Omeprazole 40 mg QD + Sucralfate 1g QD
Pending TSH and FT4
Resolved;   - S/p code sepsis in ED: 1L NS bolus + Levaquin 500mg IVPB + Tamiflu 75 mg  - CXR: devoid of consolidations or effusion  - Completed Tamiflu 75 mg BID x 5 days

## 2018-03-05 NOTE — PROGRESS NOTE ADULT - PROVIDER SPECIALTY LIST ADULT
Cardiology
Internal Medicine
Pulmonology
Internal Medicine
Cardiology
Pulmonology

## 2018-03-05 NOTE — DISCHARGE NOTE ADULT - MEDICATION SUMMARY - MEDICATIONS TO TAKE
I will START or STAY ON the medications listed below when I get home from the hospital:    aspirin 81 mg oral tablet, chewable  -- 1 tab(s) by mouth once a day  -- Indication: For Prophylactic Measure    omeprazole 40 mg oral delayed release capsule  -- 1 cap(s) by mouth once a day  -- Indication: For GERD    levoFLOXacin 500 mg oral tablet  -- 1 tab(s) by mouth every 24 hours  -- Indication: For Pneumonia

## 2018-03-05 NOTE — DISCHARGE NOTE ADULT - HOSPITAL COURSE
58 F with PMHx Gastritis, PUD who presented to ED c/o generalized weakness and fever assocaited w/ recent sick contact w/ grandchildren - code sepsis in ED and found to be +ve influenza A - later found to have EKG changes w/ c/o chest pain and transferred to telemetry for ACS r/o. The repeat EKG showed new TWI at V5 and V6,  CE negative x 3, CTA negative for PE, TTE EF wnls, G2DD, and no acute events on telemetry. Patient was treated w/ low dose statin and ACSVD risk score was 2.6 - no indicationAs per Cardiology Dr. Webb, stress test performed and was within normal limits. CTA showed LLL PNA and treated w/ Levaquin 5 days w/ plan for 2 more days outpatient. On admission patient found w/ sepsis in ED: 1L NS bolus + Levaquin 500mg IVPB + Tamiflu 75 mg, and completed 75 mg x 5 doses. Otherwise nop other acute medical issues. Patient seen and examined at bedside with no acute complaints. The medical plan and results were discussed with the patient throughout the hospital course. Patient is medically clear for discharge and is advised to follow up with her primary care physician within a week for continued medical care. Please see above and the medical records for additional information.

## 2018-03-05 NOTE — PROGRESS NOTE ADULT - ASSESSMENT
58 yr old Female with PMHx gastritis, PUD who presented to ED c/o generalized weakness, fever ,Influenza no with CP,SOB and EKG changes.  1.Tele monitoring.  2.Stress test R/O Ischemia  3.ABX.  4.Continue asa,statin.  5.GI and DVT prophylaxis.
58 F with PMHx Gastritis, PUD who presented to ED c/o generalized weakness and fever assocaited w/ recent sick contact w/ grandchildren - code sepsis in ED and found to be +ve influenza A - later found to have EKG changes w/ c/o chest pain and transferred to telemetry for ACS r/o
58 yr old Female with PMHx gastritis, PUD who presented to ED c/o generalized weakness, fever ,Influenza no with CP,SOB and EKG changes.  1.D/C Tele monitoring.  2.Stress test R/O Ischemia  3.ABX.  4.Continue asa,statin.  5.GI and DVT prophylaxis.
58 yr old Female with PMHx gastritis, PUD who presented to ED c/o generalized weakness, fever ,Influenza no with CP,SOB and EKG changes.  1.D/C Tele monitoring.  2.Stress test R/O Ischemia  3.ABX.  4.Continue asa,statin.  5.GI and DVT prophylaxis.
58 yr old Female with PMHx gastritis, PUD who presented to ED c/o generalized weakness, fever ,Influenza no with CP,SOB and EKG changes.  1.Tele monitoring.  2.Echocardiogram.  3.CTA-r/o PE.  4.ABX.  5.Continue asa,statin.  6.GI and DVT prophylaxis.

## 2018-03-05 NOTE — PROGRESS NOTE ADULT - PROBLEM SELECTOR PROBLEM 3
Routine  care  
Abnormal EKG
Gastritis
Influenza A
R/O Thyroid disease

## 2018-03-07 ENCOUNTER — EMERGENCY (EMERGENCY)
Facility: HOSPITAL | Age: 58
LOS: 1 days | Discharge: HOME | End: 2018-03-07
Attending: EMERGENCY MEDICINE
Payer: MEDICAID

## 2018-03-07 VITALS
OXYGEN SATURATION: 100 % | TEMPERATURE: 98 F | HEIGHT: 61 IN | WEIGHT: 175.05 LBS | HEART RATE: 69 BPM | DIASTOLIC BLOOD PRESSURE: 68 MMHG | SYSTOLIC BLOOD PRESSURE: 123 MMHG | RESPIRATION RATE: 24 BRPM

## 2018-03-07 DIAGNOSIS — Z98.890 OTHER SPECIFIED POSTPROCEDURAL STATES: Chronic | ICD-10-CM

## 2018-03-07 DIAGNOSIS — R69 ILLNESS, UNSPECIFIED: ICD-10-CM

## 2018-03-07 PROBLEM — K29.70 GASTRITIS, UNSPECIFIED, WITHOUT BLEEDING: Chronic | Status: ACTIVE | Noted: 2018-02-27

## 2018-03-07 LAB
ALBUMIN SERPL ELPH-MCNC: 3.6 G/DL — SIGNIFICANT CHANGE UP (ref 3.5–5)
ALP SERPL-CCNC: 112 U/L — SIGNIFICANT CHANGE UP (ref 40–120)
ALT FLD-CCNC: 107 U/L DA — HIGH (ref 10–60)
ANION GAP SERPL CALC-SCNC: 8 MMOL/L — SIGNIFICANT CHANGE UP (ref 5–17)
APTT BLD: 30.2 SEC — SIGNIFICANT CHANGE UP (ref 27.5–37.4)
AST SERPL-CCNC: 88 U/L — HIGH (ref 10–40)
BASOPHILS # BLD AUTO: 0 K/UL — SIGNIFICANT CHANGE UP (ref 0–0.2)
BASOPHILS NFR BLD AUTO: 0.5 % — SIGNIFICANT CHANGE UP (ref 0–2)
BILIRUB SERPL-MCNC: 0.2 MG/DL — SIGNIFICANT CHANGE UP (ref 0.2–1.2)
BUN SERPL-MCNC: 8 MG/DL — SIGNIFICANT CHANGE UP (ref 7–18)
CALCIUM SERPL-MCNC: 8.7 MG/DL — SIGNIFICANT CHANGE UP (ref 8.4–10.5)
CHLORIDE SERPL-SCNC: 108 MMOL/L — SIGNIFICANT CHANGE UP (ref 96–108)
CK MB BLD-MCNC: <1.2 % — SIGNIFICANT CHANGE UP (ref 0–3.5)
CK MB CFR SERPL CALC: <1 NG/ML — SIGNIFICANT CHANGE UP (ref 0–3.6)
CK SERPL-CCNC: 82 U/L — SIGNIFICANT CHANGE UP (ref 21–215)
CO2 SERPL-SCNC: 24 MMOL/L — SIGNIFICANT CHANGE UP (ref 22–31)
CREAT SERPL-MCNC: 0.59 MG/DL — SIGNIFICANT CHANGE UP (ref 0.5–1.3)
D DIMER BLD IA.RAPID-MCNC: 260 NG/ML DDU — HIGH
EOSINOPHIL # BLD AUTO: 0.2 K/UL — SIGNIFICANT CHANGE UP (ref 0–0.5)
EOSINOPHIL NFR BLD AUTO: 2.7 % — SIGNIFICANT CHANGE UP (ref 0–6)
GLUCOSE SERPL-MCNC: 108 MG/DL — HIGH (ref 70–99)
HCT VFR BLD CALC: 39.4 % — SIGNIFICANT CHANGE UP (ref 34.5–45)
HGB BLD-MCNC: 12.4 G/DL — SIGNIFICANT CHANGE UP (ref 11.5–15.5)
INR BLD: 1.09 RATIO — SIGNIFICANT CHANGE UP (ref 0.88–1.16)
LACTATE SERPL-SCNC: 1.8 MMOL/L — SIGNIFICANT CHANGE UP (ref 0.7–2)
LYMPHOCYTES # BLD AUTO: 2.9 K/UL — SIGNIFICANT CHANGE UP (ref 1–3.3)
LYMPHOCYTES # BLD AUTO: 49.5 % — HIGH (ref 13–44)
MCHC RBC-ENTMCNC: 28.1 PG — SIGNIFICANT CHANGE UP (ref 27–34)
MCHC RBC-ENTMCNC: 31.4 GM/DL — LOW (ref 32–36)
MCV RBC AUTO: 89.4 FL — SIGNIFICANT CHANGE UP (ref 80–100)
MONOCYTES # BLD AUTO: 0.3 K/UL — SIGNIFICANT CHANGE UP (ref 0–0.9)
MONOCYTES NFR BLD AUTO: 4.9 % — SIGNIFICANT CHANGE UP (ref 2–14)
NEUTROPHILS # BLD AUTO: 2.5 K/UL — SIGNIFICANT CHANGE UP (ref 1.8–7.4)
NEUTROPHILS NFR BLD AUTO: 42.4 % — LOW (ref 43–77)
NT-PROBNP SERPL-SCNC: 22 PG/ML — SIGNIFICANT CHANGE UP (ref 0–125)
PLATELET # BLD AUTO: 213 K/UL — SIGNIFICANT CHANGE UP (ref 150–400)
POTASSIUM SERPL-MCNC: 3.5 MMOL/L — SIGNIFICANT CHANGE UP (ref 3.5–5.3)
POTASSIUM SERPL-SCNC: 3.5 MMOL/L — SIGNIFICANT CHANGE UP (ref 3.5–5.3)
PROT SERPL-MCNC: 8 G/DL — SIGNIFICANT CHANGE UP (ref 6–8.3)
PROTHROM AB SERPL-ACNC: 11.9 SEC — SIGNIFICANT CHANGE UP (ref 9.8–12.7)
RBC # BLD: 4.41 M/UL — SIGNIFICANT CHANGE UP (ref 3.8–5.2)
RBC # FLD: 12.5 % — SIGNIFICANT CHANGE UP (ref 10.3–14.5)
SODIUM SERPL-SCNC: 140 MMOL/L — SIGNIFICANT CHANGE UP (ref 135–145)
TROPONIN I SERPL-MCNC: <0.015 NG/ML — SIGNIFICANT CHANGE UP (ref 0–0.04)
TROPONIN I SERPL-MCNC: <0.015 NG/ML — SIGNIFICANT CHANGE UP (ref 0–0.04)
WBC # BLD: 5.9 K/UL — SIGNIFICANT CHANGE UP (ref 3.8–10.5)
WBC # FLD AUTO: 5.9 K/UL — SIGNIFICANT CHANGE UP (ref 3.8–10.5)

## 2018-03-07 PROCEDURE — 85730 THROMBOPLASTIN TIME PARTIAL: CPT

## 2018-03-07 PROCEDURE — 85027 COMPLETE CBC AUTOMATED: CPT

## 2018-03-07 PROCEDURE — 71045 X-RAY EXAM CHEST 1 VIEW: CPT | Mod: 26

## 2018-03-07 PROCEDURE — 85379 FIBRIN DEGRADATION QUANT: CPT

## 2018-03-07 PROCEDURE — 71045 X-RAY EXAM CHEST 1 VIEW: CPT

## 2018-03-07 PROCEDURE — 82550 ASSAY OF CK (CPK): CPT

## 2018-03-07 PROCEDURE — 80053 COMPREHEN METABOLIC PANEL: CPT

## 2018-03-07 PROCEDURE — 85610 PROTHROMBIN TIME: CPT

## 2018-03-07 PROCEDURE — 94640 AIRWAY INHALATION TREATMENT: CPT

## 2018-03-07 PROCEDURE — 84484 ASSAY OF TROPONIN QUANT: CPT

## 2018-03-07 PROCEDURE — 99285 EMERGENCY DEPT VISIT HI MDM: CPT | Mod: 25

## 2018-03-07 PROCEDURE — 82553 CREATINE MB FRACTION: CPT

## 2018-03-07 PROCEDURE — 93005 ELECTROCARDIOGRAM TRACING: CPT

## 2018-03-07 PROCEDURE — 87040 BLOOD CULTURE FOR BACTERIA: CPT

## 2018-03-07 PROCEDURE — 99283 EMERGENCY DEPT VISIT LOW MDM: CPT | Mod: 25

## 2018-03-07 PROCEDURE — 83880 ASSAY OF NATRIURETIC PEPTIDE: CPT

## 2018-03-07 PROCEDURE — 83605 ASSAY OF LACTIC ACID: CPT

## 2018-03-07 RX ORDER — ALBUTEROL 90 UG/1
2.5 AEROSOL, METERED ORAL ONCE
Qty: 0 | Refills: 0 | Status: COMPLETED | OUTPATIENT
Start: 2018-03-07 | End: 2018-03-07

## 2018-03-07 RX ORDER — SODIUM CHLORIDE 9 MG/ML
3 INJECTION INTRAMUSCULAR; INTRAVENOUS; SUBCUTANEOUS ONCE
Qty: 0 | Refills: 0 | Status: COMPLETED | OUTPATIENT
Start: 2018-03-07 | End: 2018-03-07

## 2018-03-07 RX ORDER — ALBUTEROL 90 UG/1
2 AEROSOL, METERED ORAL
Qty: 1 | Refills: 0
Start: 2018-03-07

## 2018-03-07 RX ADMIN — SODIUM CHLORIDE 3 MILLILITER(S): 9 INJECTION INTRAMUSCULAR; INTRAVENOUS; SUBCUTANEOUS at 01:55

## 2018-03-07 RX ADMIN — ALBUTEROL 2.5 MILLIGRAM(S): 90 AEROSOL, METERED ORAL at 01:55

## 2018-03-07 NOTE — ED PROVIDER NOTE - MEDICAL DECISION MAKING DETAILS
5:11a- Pt feels better, no chest pain, no shortness of breath, requesting discharge and will continue with her abx. I will rx Ventolin and pt states will f/u with PMD Dr. Floyd.

## 2018-03-07 NOTE — ED PROVIDER NOTE - OBJECTIVE STATEMENT
57 y/o F p/w cc of SOB that started x 45 min PTA. Pt also w/ midsternal chest discomfort. Pt states she was recently discharged from [facility], was seen for PNA, influenza and CHF. Pt denies fever, vomiting, or any other complaints. NKDA

## 2018-03-07 NOTE — ED PROVIDER NOTE - PROGRESS NOTE DETAILS
Pt feels better, no chest pain, no shortness of breath. Pt had recent CTA demonstrating no TPA. Pt adaamntly requesting discharge, states in on oral abxs. I will repeat second trop

## 2018-03-12 LAB
CULTURE RESULTS: SIGNIFICANT CHANGE UP
CULTURE RESULTS: SIGNIFICANT CHANGE UP
SPECIMEN SOURCE: SIGNIFICANT CHANGE UP
SPECIMEN SOURCE: SIGNIFICANT CHANGE UP

## 2019-04-13 NOTE — DISCHARGE NOTE ADULT - CARE PLAN
Principal Discharge DX:	Pneumonia  Goal:	Complete 2 more days of oral antibiotics  Assessment and plan of treatment:	The chest x-ray was negative but the CT scan of the lung showed left lower lobe pneumonia and you were treated with 5 days of oral antibiotics - please complete 2 more days and follow up with your primary care physician after completion of therapy.  Secondary Diagnosis:	Abnormal EKG  Assessment and plan of treatment:	During your stay you had chest pain with new changes shown on the electrocardiogram. Cardiology was consulted and a transthoracic echocardiogram whose ejection fraction is within normal limits. A stress test was done negative for reversible ischemia - no further work up required at this time.  Secondary Diagnosis:	Influenza A  Assessment and plan of treatment:	On admission you were found to have influenza A and completed therapy and showed clinical improvement. No

## 2021-04-20 ENCOUNTER — INPATIENT (INPATIENT)
Facility: HOSPITAL | Age: 61
LOS: 1 days | Discharge: HOME HEALTH | DRG: 563 | End: 2021-04-22
Attending: ORTHOPAEDIC SURGERY | Admitting: ORTHOPAEDIC SURGERY
Payer: COMMERCIAL

## 2021-04-20 VITALS
OXYGEN SATURATION: 96 % | RESPIRATION RATE: 18 BRPM | WEIGHT: 164.91 LBS | DIASTOLIC BLOOD PRESSURE: 85 MMHG | SYSTOLIC BLOOD PRESSURE: 155 MMHG | TEMPERATURE: 98 F | HEART RATE: 76 BPM

## 2021-04-20 DIAGNOSIS — Z98.890 OTHER SPECIFIED POSTPROCEDURAL STATES: Chronic | ICD-10-CM

## 2021-04-20 LAB
ALBUMIN SERPL ELPH-MCNC: 3.8 G/DL — SIGNIFICANT CHANGE UP (ref 3.4–5)
ALP SERPL-CCNC: 117 U/L — SIGNIFICANT CHANGE UP (ref 40–120)
ALT FLD-CCNC: 38 U/L — SIGNIFICANT CHANGE UP (ref 12–42)
ANION GAP SERPL CALC-SCNC: 9 MMOL/L — SIGNIFICANT CHANGE UP (ref 9–16)
APTT BLD: 35.3 SEC — SIGNIFICANT CHANGE UP (ref 27.5–35.5)
AST SERPL-CCNC: 22 U/L — SIGNIFICANT CHANGE UP (ref 15–37)
BASOPHILS # BLD AUTO: 0.01 K/UL — SIGNIFICANT CHANGE UP (ref 0–0.2)
BASOPHILS NFR BLD AUTO: 0.2 % — SIGNIFICANT CHANGE UP (ref 0–2)
BILIRUB SERPL-MCNC: 0.2 MG/DL — SIGNIFICANT CHANGE UP (ref 0.2–1.2)
BUN SERPL-MCNC: 15 MG/DL — SIGNIFICANT CHANGE UP (ref 7–23)
CALCIUM SERPL-MCNC: 8.7 MG/DL — SIGNIFICANT CHANGE UP (ref 8.5–10.5)
CHLORIDE SERPL-SCNC: 107 MMOL/L — SIGNIFICANT CHANGE UP (ref 96–108)
CO2 SERPL-SCNC: 27 MMOL/L — SIGNIFICANT CHANGE UP (ref 22–31)
CREAT SERPL-MCNC: 0.63 MG/DL — SIGNIFICANT CHANGE UP (ref 0.5–1.3)
EOSINOPHIL # BLD AUTO: 0.14 K/UL — SIGNIFICANT CHANGE UP (ref 0–0.5)
EOSINOPHIL NFR BLD AUTO: 2.7 % — SIGNIFICANT CHANGE UP (ref 0–6)
GLUCOSE SERPL-MCNC: 99 MG/DL — SIGNIFICANT CHANGE UP (ref 70–99)
HCT VFR BLD CALC: 37.2 % — SIGNIFICANT CHANGE UP (ref 34.5–45)
HGB BLD-MCNC: 12.1 G/DL — SIGNIFICANT CHANGE UP (ref 11.5–15.5)
IMM GRANULOCYTES NFR BLD AUTO: 0.6 % — SIGNIFICANT CHANGE UP (ref 0–1.5)
INR BLD: 1.02 — SIGNIFICANT CHANGE UP (ref 0.88–1.16)
LYMPHOCYTES # BLD AUTO: 2.17 K/UL — SIGNIFICANT CHANGE UP (ref 1–3.3)
LYMPHOCYTES # BLD AUTO: 42.1 % — SIGNIFICANT CHANGE UP (ref 13–44)
MCHC RBC-ENTMCNC: 29.8 PG — SIGNIFICANT CHANGE UP (ref 27–34)
MCHC RBC-ENTMCNC: 32.5 GM/DL — SIGNIFICANT CHANGE UP (ref 32–36)
MCV RBC AUTO: 91.6 FL — SIGNIFICANT CHANGE UP (ref 80–100)
MONOCYTES # BLD AUTO: 0.23 K/UL — SIGNIFICANT CHANGE UP (ref 0–0.9)
MONOCYTES NFR BLD AUTO: 4.5 % — SIGNIFICANT CHANGE UP (ref 2–14)
NEUTROPHILS # BLD AUTO: 2.57 K/UL — SIGNIFICANT CHANGE UP (ref 1.8–7.4)
NEUTROPHILS NFR BLD AUTO: 49.9 % — SIGNIFICANT CHANGE UP (ref 43–77)
NRBC # BLD: 0 /100 WBCS — SIGNIFICANT CHANGE UP (ref 0–0)
PLATELET # BLD AUTO: 190 K/UL — SIGNIFICANT CHANGE UP (ref 150–400)
POTASSIUM SERPL-MCNC: 3.6 MMOL/L — SIGNIFICANT CHANGE UP (ref 3.5–5.3)
POTASSIUM SERPL-SCNC: 3.6 MMOL/L — SIGNIFICANT CHANGE UP (ref 3.5–5.3)
PROT SERPL-MCNC: 7.6 G/DL — SIGNIFICANT CHANGE UP (ref 6.4–8.2)
PROTHROM AB SERPL-ACNC: 12.2 SEC — SIGNIFICANT CHANGE UP (ref 10.6–13.6)
RBC # BLD: 4.06 M/UL — SIGNIFICANT CHANGE UP (ref 3.8–5.2)
RBC # FLD: 13.2 % — SIGNIFICANT CHANGE UP (ref 10.3–14.5)
SARS-COV-2 RNA SPEC QL NAA+PROBE: SIGNIFICANT CHANGE UP
SODIUM SERPL-SCNC: 143 MMOL/L — SIGNIFICANT CHANGE UP (ref 132–145)
WBC # BLD: 5.15 K/UL — SIGNIFICANT CHANGE UP (ref 3.8–10.5)
WBC # FLD AUTO: 5.15 K/UL — SIGNIFICANT CHANGE UP (ref 3.8–10.5)

## 2021-04-20 PROCEDURE — 93010 ELECTROCARDIOGRAM REPORT: CPT

## 2021-04-20 PROCEDURE — 99284 EMERGENCY DEPT VISIT MOD MDM: CPT | Mod: 25

## 2021-04-20 PROCEDURE — 73564 X-RAY EXAM KNEE 4 OR MORE: CPT | Mod: 26,RT

## 2021-04-20 PROCEDURE — 99221 1ST HOSP IP/OBS SF/LOW 40: CPT

## 2021-04-20 PROCEDURE — 73700 CT LOWER EXTREMITY W/O DYE: CPT | Mod: 26,RT,76

## 2021-04-20 RX ORDER — KETOROLAC TROMETHAMINE 30 MG/ML
30 SYRINGE (ML) INJECTION ONCE
Refills: 0 | Status: DISCONTINUED | OUTPATIENT
Start: 2021-04-20 | End: 2021-04-20

## 2021-04-20 RX ORDER — MORPHINE SULFATE 50 MG/1
4 CAPSULE, EXTENDED RELEASE ORAL ONCE
Refills: 0 | Status: DISCONTINUED | OUTPATIENT
Start: 2021-04-20 | End: 2021-04-20

## 2021-04-20 RX ADMIN — MORPHINE SULFATE 4 MILLIGRAM(S): 50 CAPSULE, EXTENDED RELEASE ORAL at 20:38

## 2021-04-20 RX ADMIN — Medication 30 MILLIGRAM(S): at 19:38

## 2021-04-20 RX ADMIN — Medication 30 MILLIGRAM(S): at 20:17

## 2021-04-20 NOTE — ED ADULT NURSE NOTE - NSIMPLEMENTINTERV_GEN_ALL_ED
Implemented All Universal Safety Interventions:  [place] to call system. Call bell, personal items and telephone within reach. Instruct patient to call for assistance. Room bathroom lighting operational. Non-slip footwear when patient is off stretcher. Physically safe environment: no spills, clutter or unnecessary equipment. Stretcher in lowest position, wheels locked, appropriate side rails in place.
alert

## 2021-04-20 NOTE — ED PROVIDER NOTE - INTERPRETATION
S: Shift Note  B: 1 day old , delivered vaginally.  A: Stable . formula feeding, tolerating feedings well.  Meconium sent but have not been able to successfully collect urine.  Temps remain stable.  Blood sugars have been WNL.    R: Continue with current POC   65 yo M w/ PMH HTN, T2DM, CAD s/p stent 2009, 2019, carotid stenosis, ESRD on HD ([place]), CVA (12/2019) s/p tpa (on plavix) c/b dysphagia s/p PEG, a. fib (on eliquis) who presents from Rehab facility w/a hgb level of 6.6; hgb noted to be 7.1 on presentation, subsequently admitted for GI w/u normal
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
## 2021-04-21 PROBLEM — I50.9 HEART FAILURE, UNSPECIFIED: Chronic | Status: ACTIVE | Noted: 2018-03-07

## 2021-04-21 LAB
ANION GAP SERPL CALC-SCNC: 10 MMOL/L — SIGNIFICANT CHANGE UP (ref 5–17)
APPEARANCE UR: CLEAR — SIGNIFICANT CHANGE UP
BASOPHILS # BLD AUTO: 0.01 K/UL — SIGNIFICANT CHANGE UP (ref 0–0.2)
BASOPHILS NFR BLD AUTO: 0.2 % — SIGNIFICANT CHANGE UP (ref 0–2)
BILIRUB UR-MCNC: NEGATIVE — SIGNIFICANT CHANGE UP
BLD GP AB SCN SERPL QL: NEGATIVE — SIGNIFICANT CHANGE UP
BUN SERPL-MCNC: 14 MG/DL — SIGNIFICANT CHANGE UP (ref 7–23)
CALCIUM SERPL-MCNC: 8.6 MG/DL — SIGNIFICANT CHANGE UP (ref 8.4–10.5)
CHLORIDE SERPL-SCNC: 106 MMOL/L — SIGNIFICANT CHANGE UP (ref 96–108)
CO2 SERPL-SCNC: 23 MMOL/L — SIGNIFICANT CHANGE UP (ref 22–31)
COLOR SPEC: YELLOW — SIGNIFICANT CHANGE UP
COVID-19 SPIKE DOMAIN AB INTERP: POSITIVE
COVID-19 SPIKE DOMAIN ANTIBODY RESULT: >250 U/ML — HIGH
CREAT SERPL-MCNC: 0.58 MG/DL — SIGNIFICANT CHANGE UP (ref 0.5–1.3)
DIFF PNL FLD: NEGATIVE — SIGNIFICANT CHANGE UP
EOSINOPHIL # BLD AUTO: 0.13 K/UL — SIGNIFICANT CHANGE UP (ref 0–0.5)
EOSINOPHIL NFR BLD AUTO: 2.8 % — SIGNIFICANT CHANGE UP (ref 0–6)
GLUCOSE SERPL-MCNC: 102 MG/DL — HIGH (ref 70–99)
GLUCOSE UR QL: NEGATIVE — SIGNIFICANT CHANGE UP
HCT VFR BLD CALC: 35.6 % — SIGNIFICANT CHANGE UP (ref 34.5–45)
HGB BLD-MCNC: 11.4 G/DL — LOW (ref 11.5–15.5)
IMM GRANULOCYTES NFR BLD AUTO: 0.4 % — SIGNIFICANT CHANGE UP (ref 0–1.5)
KETONES UR-MCNC: NEGATIVE — SIGNIFICANT CHANGE UP
LEUKOCYTE ESTERASE UR-ACNC: NEGATIVE — SIGNIFICANT CHANGE UP
LYMPHOCYTES # BLD AUTO: 1.89 K/UL — SIGNIFICANT CHANGE UP (ref 1–3.3)
LYMPHOCYTES # BLD AUTO: 41.3 % — SIGNIFICANT CHANGE UP (ref 13–44)
MCHC RBC-ENTMCNC: 29.4 PG — SIGNIFICANT CHANGE UP (ref 27–34)
MCHC RBC-ENTMCNC: 32 GM/DL — SIGNIFICANT CHANGE UP (ref 32–36)
MCV RBC AUTO: 91.8 FL — SIGNIFICANT CHANGE UP (ref 80–100)
MONOCYTES # BLD AUTO: 0.37 K/UL — SIGNIFICANT CHANGE UP (ref 0–0.9)
MONOCYTES NFR BLD AUTO: 8.1 % — SIGNIFICANT CHANGE UP (ref 2–14)
NEUTROPHILS # BLD AUTO: 2.16 K/UL — SIGNIFICANT CHANGE UP (ref 1.8–7.4)
NEUTROPHILS NFR BLD AUTO: 47.2 % — SIGNIFICANT CHANGE UP (ref 43–77)
NITRITE UR-MCNC: NEGATIVE — SIGNIFICANT CHANGE UP
NRBC # BLD: 0 /100 WBCS — SIGNIFICANT CHANGE UP (ref 0–0)
PH UR: 6 — SIGNIFICANT CHANGE UP (ref 5–8)
PLATELET # BLD AUTO: 182 K/UL — SIGNIFICANT CHANGE UP (ref 150–400)
POTASSIUM SERPL-MCNC: 3.6 MMOL/L — SIGNIFICANT CHANGE UP (ref 3.5–5.3)
POTASSIUM SERPL-SCNC: 3.6 MMOL/L — SIGNIFICANT CHANGE UP (ref 3.5–5.3)
PROT UR-MCNC: NEGATIVE MG/DL — SIGNIFICANT CHANGE UP
RBC # BLD: 3.88 M/UL — SIGNIFICANT CHANGE UP (ref 3.8–5.2)
RBC # FLD: 13.3 % — SIGNIFICANT CHANGE UP (ref 10.3–14.5)
RH IG SCN BLD-IMP: POSITIVE — SIGNIFICANT CHANGE UP
SARS-COV-2 IGG+IGM SERPL QL IA: >250 U/ML — HIGH
SARS-COV-2 IGG+IGM SERPL QL IA: POSITIVE
SODIUM SERPL-SCNC: 139 MMOL/L — SIGNIFICANT CHANGE UP (ref 135–145)
SP GR SPEC: >=1.03 — SIGNIFICANT CHANGE UP (ref 1–1.03)
UROBILINOGEN FLD QL: 0.2 E.U./DL — SIGNIFICANT CHANGE UP
WBC # BLD: 4.58 K/UL — SIGNIFICANT CHANGE UP (ref 3.8–10.5)
WBC # FLD AUTO: 4.58 K/UL — SIGNIFICANT CHANGE UP (ref 3.8–10.5)

## 2021-04-21 PROCEDURE — 71045 X-RAY EXAM CHEST 1 VIEW: CPT | Mod: 26

## 2021-04-21 PROCEDURE — 99233 SBSQ HOSP IP/OBS HIGH 50: CPT | Mod: 57

## 2021-04-21 PROCEDURE — 99223 1ST HOSP IP/OBS HIGH 75: CPT

## 2021-04-21 RX ORDER — OXYCODONE HYDROCHLORIDE 5 MG/1
5 TABLET ORAL EVERY 4 HOURS
Refills: 0 | Status: DISCONTINUED | OUTPATIENT
Start: 2021-04-21 | End: 2021-04-22

## 2021-04-21 RX ORDER — FAMOTIDINE 10 MG/ML
20 INJECTION INTRAVENOUS EVERY 12 HOURS
Refills: 0 | Status: DISCONTINUED | OUTPATIENT
Start: 2021-04-21 | End: 2021-04-22

## 2021-04-21 RX ORDER — MAGNESIUM HYDROXIDE 400 MG/1
30 TABLET, CHEWABLE ORAL DAILY
Refills: 0 | Status: DISCONTINUED | OUTPATIENT
Start: 2021-04-21 | End: 2021-04-22

## 2021-04-21 RX ORDER — SODIUM CHLORIDE 9 MG/ML
1000 INJECTION, SOLUTION INTRAVENOUS
Refills: 0 | Status: DISCONTINUED | OUTPATIENT
Start: 2021-04-21 | End: 2021-04-21

## 2021-04-21 RX ORDER — ASPIRIN/CALCIUM CARB/MAGNESIUM 324 MG
81 TABLET ORAL
Refills: 0 | Status: DISCONTINUED | OUTPATIENT
Start: 2021-04-21 | End: 2021-04-21

## 2021-04-21 RX ORDER — ASPIRIN/CALCIUM CARB/MAGNESIUM 324 MG
81 TABLET ORAL
Refills: 0 | Status: DISCONTINUED | OUTPATIENT
Start: 2021-04-21 | End: 2021-04-22

## 2021-04-21 RX ORDER — OXYCODONE HYDROCHLORIDE 5 MG/1
10 TABLET ORAL EVERY 4 HOURS
Refills: 0 | Status: DISCONTINUED | OUTPATIENT
Start: 2021-04-21 | End: 2021-04-22

## 2021-04-21 RX ORDER — HYDROMORPHONE HYDROCHLORIDE 2 MG/ML
0.5 INJECTION INTRAMUSCULAR; INTRAVENOUS; SUBCUTANEOUS EVERY 6 HOURS
Refills: 0 | Status: DISCONTINUED | OUTPATIENT
Start: 2021-04-21 | End: 2021-04-22

## 2021-04-21 RX ORDER — DIPHENHYDRAMINE HCL 50 MG
25 CAPSULE ORAL AT BEDTIME
Refills: 0 | Status: DISCONTINUED | OUTPATIENT
Start: 2021-04-21 | End: 2021-04-22

## 2021-04-21 RX ADMIN — OXYCODONE HYDROCHLORIDE 5 MILLIGRAM(S): 5 TABLET ORAL at 14:31

## 2021-04-21 RX ADMIN — FAMOTIDINE 20 MILLIGRAM(S): 10 INJECTION INTRAVENOUS at 17:39

## 2021-04-21 RX ADMIN — Medication 81 MILLIGRAM(S): at 06:04

## 2021-04-21 RX ADMIN — FAMOTIDINE 20 MILLIGRAM(S): 10 INJECTION INTRAVENOUS at 06:04

## 2021-04-21 RX ADMIN — Medication 81 MILLIGRAM(S): at 17:39

## 2021-04-21 RX ADMIN — Medication 1 TABLET(S): at 12:04

## 2021-04-21 RX ADMIN — OXYCODONE HYDROCHLORIDE 5 MILLIGRAM(S): 5 TABLET ORAL at 13:43

## 2021-04-21 NOTE — H&P ADULT - ATTENDING COMMENTS
Minimally displaced comminuted patella frqacture.  Expect patient will be able to mobilize with assistance with adequate pain control, rest and bracing.  if unable to proceed to ambulation may require cerclage, but overall risk is low.  dmit for therapy and pain control, if unable to tolerate will plan for surgery 4/22.

## 2021-04-21 NOTE — H&P ADULT - ASSESSMENT
A/P: 61yFemale presents with R patella fracture following mechanical fall. Placed in knee immobilizer.  -Maintain KI at all times  -PT, WBS: WBAT  -Pending surgical plan. If patient able to bear weight and passes PT, will likely non-op. If not, will proceed with ORIF.  -Discussed with Dr. Watson    Ortho Pager [phone]

## 2021-04-21 NOTE — H&P ADULT - NSHPPHYSICALEXAM_GEN_ALL_CORE
Physical Exam:  General: NAD, resting comfortably in bed  RLE:  Skin intact, knee swollen and TTP  5/5 EHL/FHL/TA/GS  SILT over distal limb  DP/PT palpable

## 2021-04-21 NOTE — PHYSICAL THERAPY INITIAL EVALUATION ADULT - PERTINENT HX OF CURRENT PROBLEM, REHAB EVAL
61yFemale with PMH of diverticulosis presents with R knee pain after mechanical fall. Pt was exiting elevator and tripped on her shoelace. Was immediately unable to bear weight on that limb. In ED, x-rays showed comminuted but minimally displaced patella fracture. Denies any numbness or tingling. Denies injury elswhere.

## 2021-04-21 NOTE — CONSULT NOTE ADULT - SUBJECTIVE AND OBJECTIVE BOX
Patient is a 61y old  Female who presents with a chief complaint of     HPI:  For Surgeon: Dr. Watson    HPI:  61yFemale with PMH of diverticulosis presents with R knee pain after mechanical fall. Pt was exiting elevator and tripped on her shoelace. Was immediately unable to bear weight on that limb. In ED, x-rays showed comminuted but minimally displaced patella fracture. Denies any numbness or tingling. Denies injury elswhere.   (21 Apr 2021 10:11)      INTERVAL HPI/OVERNIGHT EVENTS:  Patient was seen and examined at bedside. As per nurse and patient, no o/n events, patient resting comfortably. Notes if she does not move her knee she does not have pain however any movement makes it worse. She denies dizziness, LOS, HA, CP, SOB prior to fall, was mechanical in nature when she tripped over her shoe lace. Patient denies: fever, chills, dizziness, weakness, HA, Changes in vision, CP, palpitations, SOB, cough, N/V/D/C, dysuria, changes in bowel movements, LE edema.      PAST MEDICAL & SURGICAL HISTORY:  Gastritis    CHF (congestive heart failure)    Diverticulosis    H/O: hysterectomy  2002        SOCIAL HISTORY  Alcohol: denies  Tobacco: denies  Illicit substance use: denies      FAMILY HISTORY:    REVIEW OF SYSTEMS:  CONSTITUTIONAL: No fever, weight loss, or fatigue  EYES: No eye pain, visual disturbances, or discharge  ENMT:  No difficulty hearing, tinnitus, vertigo; No sinus or throat pain  NECK: No pain or stiffness  RESPIRATORY: No cough, wheezing, chills or hemoptysis; No shortness of breath  CARDIOVASCULAR: No chest pain, palpitations, dizziness, or leg swelling  GASTROINTESTINAL: No abdominal or epigastric pain. No nausea, vomiting, or hematemesis; No diarrhea or constipation. No melena or hematochezia.  GENITOURINARY: No dysuria, frequency, hematuria, or incontinence  NEUROLOGICAL: No headaches, memory loss, loss of strength, numbness, or tremors  SKIN: No itching, burning, rashes, or lesions   LYMPH NODES: No enlarged glands  ENDOCRINE: No heat or cold intolerance; No hair loss  MUSCULOSKELETAL: as above  PSYCHIATRIC: No depression, anxiety, mood swings, or difficulty sleeping  HEME/LYMPH: No easy bruising, or bleeding gums  ALLERY AND IMMUNOLOGIC: No hives or eczema    T(C): 36.7 (04-21-21 @ 08:42), Max: 37.1 (04-21-21 @ 00:24)  HR: 78 (04-21-21 @ 08:42) (66 - 78)  BP: 113/74 (04-21-21 @ 08:42) (113/74 - 155/85)  RR: 17 (04-21-21 @ 08:42) (16 - 18)  SpO2: 95% (04-21-21 @ 08:42) (95% - 97%)  Wt(kg): --  I&O's Summary    21 Apr 2021 07:01  -  21 Apr 2021 15:31  --------------------------------------------------------  IN: 480 mL / OUT: 960 mL / NET: -480 mL        PHYSICAL EXAM:  GENERAL: NAD, laying comfortably in bed  HEAD:  Atraumatic, Normocephalic  EYES: EOMI, PERRLA, conjunctiva and sclera clear  ENMT: No tonsillar erythema, exudates, or enlargement; MMM  NECK: Supple, No JVD  NERVOUS SYSTEM:  Alert & Oriented X3, no focal deficits   CHEST/LUNG: Clear to percussion bilaterally; No rales, rhonchi, wheezing, or rubs  HEART: Regular rate and rhythm; No murmurs, rubs, or gallops  ABDOMEN: Soft, Nontender, Nondistended; Bowel sounds present  EXTREMITIES:  R Knee in immobilizer, tenderness to palpation, with swelling over patella, + peripheral DP pulse  LYMPH: No lymphadenopathy noted  SKIN: No rashes or lesions        LABS:                        11.4   4.58  )-----------( 182      ( 21 Apr 2021 11:09 )             35.6     04-21    139  |  106  |  14  ----------------------------<  102<H>  3.6   |  23  |  0.58    Ca    8.6      21 Apr 2021 11:09    TPro  7.6  /  Alb  3.8  /  TBili  0.2  /  DBili  x   /  AST  22  /  ALT  38  /  AlkPhos  117  04-20    PT/INR - ( 20 Apr 2021 20:56 )   PT: 12.2 sec;   INR: 1.02          PTT - ( 20 Apr 2021 20:56 )  PTT:35.3 sec  Urinalysis Basic - ( 21 Apr 2021 07:00 )    Color: Yellow / Appearance: Clear / SG: >=1.030 / pH: x  Gluc: x / Ketone: NEGATIVE  / Bili: Negative / Urobili: 0.2 E.U./dL   Blood: x / Protein: NEGATIVE mg/dL / Nitrite: NEGATIVE   Leuk Esterase: NEGATIVE / RBC: x / WBC x   Sq Epi: x / Non Sq Epi: x / Bacteria: x      CAPILLARY BLOOD GLUCOSE            Urinalysis Basic - ( 21 Apr 2021 07:00 )    Color: Yellow / Appearance: Clear / SG: >=1.030 / pH: x  Gluc: x / Ketone: NEGATIVE  / Bili: Negative / Urobili: 0.2 E.U./dL   Blood: x / Protein: NEGATIVE mg/dL / Nitrite: NEGATIVE   Leuk Esterase: NEGATIVE / RBC: x / WBC x   Sq Epi: x / Non Sq Epi: x / Bacteria: x        MEDICATIONS  (STANDING):  aspirin  chewable 81 milliGRAM(s) Oral two times a day  famotidine    Tablet 20 milliGRAM(s) Oral every 12 hours  lactated ringers. 1000 milliLiter(s) (100 mL/Hr) IV Continuous <Continuous>  multivitamin 1 Tablet(s) Oral daily    MEDICATIONS  (PRN):  diphenhydrAMINE 25 milliGRAM(s) Oral at bedtime PRN Insomnia  HYDROmorphone  Injectable 0.5 milliGRAM(s) IV Push every 6 hours PRN Breakthrough  magnesium hydroxide Suspension 30 milliLiter(s) Oral daily PRN Constipation  oxyCODONE    IR 10 milliGRAM(s) Oral every 4 hours PRN Severe Pain (7 - 10)  oxyCODONE    IR 5 milliGRAM(s) Oral every 4 hours PRN Moderate Pain (4 - 6)      RADIOLOGY & ADDITIONAL TESTS:    Imaging Personally Reviewed:  [ ] YES  [ ] NO    Consultant(s) Notes Reviewed:  [ ] YES  [ ] NO    Care Discussed with Consultants/Other Providers [ ] YES  [ ] NO

## 2021-04-21 NOTE — CONSULT NOTE ADULT - ASSESSMENT
61yFemale with PMH of diverticulosis presents with R knee pain after mechanical fall. Admitted for R patella fx     #R patella Fx- plan per primary team, pain control PT, possible OR, IS  #PreOP- Patient performs ADLs independently, has no decrease in exercise tolerance, no limitations on walking. RCRI 0, Class I risk for COREEN. Can proceed to OR without further cardiac workup  #Dizziness- Notes since her vaccine she has had intermittent dizziness, was on meclizine however has not experienced in some time, currently asx   #GERD- on ppi at home, continue at this time  #Anemia- no e/o bleeding, continue to trend, may need iron panel as outpt

## 2021-04-21 NOTE — PHYSICAL THERAPY INITIAL EVALUATION ADULT - ADDITIONAL COMMENTS
Pt lives in an elevator access apartment with daughter. Pt reports to use SC occasionally prior to admission.

## 2021-04-21 NOTE — H&P ADULT - HISTORY OF PRESENT ILLNESS
For Surgeon: Dr. Watson    HPI:  61yFemale with PMH of diverticulosis presents with R knee pain after mechanical fall. Pt was exiting elevator and tripped on her shoelace. Was immediately unable to bear weight on that limb. In ED, x-rays showed comminuted but minimally displaced patella fracture. Denies any numbness or tingling. Denies injury elswhere.

## 2021-04-22 ENCOUNTER — TRANSCRIPTION ENCOUNTER (OUTPATIENT)
Age: 61
End: 2021-04-22

## 2021-04-22 VITALS — HEART RATE: 81 BPM | OXYGEN SATURATION: 95 % | SYSTOLIC BLOOD PRESSURE: 146 MMHG | DIASTOLIC BLOOD PRESSURE: 87 MMHG

## 2021-04-22 LAB
CULTURE RESULTS: SIGNIFICANT CHANGE UP
HCV AB S/CO SERPL IA: 0.05 S/CO — SIGNIFICANT CHANGE UP
HCV AB SERPL-IMP: SIGNIFICANT CHANGE UP
SPECIMEN SOURCE: SIGNIFICANT CHANGE UP

## 2021-04-22 PROCEDURE — 99285 EMERGENCY DEPT VISIT HI MDM: CPT | Mod: 25

## 2021-04-22 PROCEDURE — 97116 GAIT TRAINING THERAPY: CPT

## 2021-04-22 PROCEDURE — 96374 THER/PROPH/DIAG INJ IV PUSH: CPT

## 2021-04-22 PROCEDURE — 73700 CT LOWER EXTREMITY W/O DYE: CPT

## 2021-04-22 PROCEDURE — 80053 COMPREHEN METABOLIC PANEL: CPT

## 2021-04-22 PROCEDURE — 36415 COLL VENOUS BLD VENIPUNCTURE: CPT

## 2021-04-22 PROCEDURE — 87086 URINE CULTURE/COLONY COUNT: CPT

## 2021-04-22 PROCEDURE — 85610 PROTHROMBIN TIME: CPT

## 2021-04-22 PROCEDURE — 86900 BLOOD TYPING SEROLOGIC ABO: CPT

## 2021-04-22 PROCEDURE — 86769 SARS-COV-2 COVID-19 ANTIBODY: CPT

## 2021-04-22 PROCEDURE — 97161 PT EVAL LOW COMPLEX 20 MIN: CPT

## 2021-04-22 PROCEDURE — 85730 THROMBOPLASTIN TIME PARTIAL: CPT

## 2021-04-22 PROCEDURE — 86850 RBC ANTIBODY SCREEN: CPT

## 2021-04-22 PROCEDURE — 80048 BASIC METABOLIC PNL TOTAL CA: CPT

## 2021-04-22 PROCEDURE — 99232 SBSQ HOSP IP/OBS MODERATE 35: CPT

## 2021-04-22 PROCEDURE — 73564 X-RAY EXAM KNEE 4 OR MORE: CPT

## 2021-04-22 PROCEDURE — 87635 SARS-COV-2 COVID-19 AMP PRB: CPT

## 2021-04-22 PROCEDURE — 99231 SBSQ HOSP IP/OBS SF/LOW 25: CPT | Mod: GC

## 2021-04-22 PROCEDURE — 81003 URINALYSIS AUTO W/O SCOPE: CPT

## 2021-04-22 PROCEDURE — 85025 COMPLETE CBC W/AUTO DIFF WBC: CPT

## 2021-04-22 PROCEDURE — 71045 X-RAY EXAM CHEST 1 VIEW: CPT

## 2021-04-22 PROCEDURE — 86803 HEPATITIS C AB TEST: CPT

## 2021-04-22 PROCEDURE — 96372 THER/PROPH/DIAG INJ SC/IM: CPT

## 2021-04-22 PROCEDURE — 86901 BLOOD TYPING SEROLOGIC RH(D): CPT

## 2021-04-22 RX ORDER — OXYCODONE HYDROCHLORIDE 5 MG/1
1 TABLET ORAL
Qty: 28 | Refills: 0
Start: 2021-04-22 | End: 2021-04-28

## 2021-04-22 RX ORDER — ASPIRIN/CALCIUM CARB/MAGNESIUM 324 MG
1 TABLET ORAL
Qty: 56 | Refills: 0
Start: 2021-04-22 | End: 2021-05-19

## 2021-04-22 RX ORDER — ONDANSETRON 8 MG/1
1 TABLET, FILM COATED ORAL
Qty: 15 | Refills: 0
Start: 2021-04-22 | End: 2021-04-26

## 2021-04-22 RX ORDER — ONDANSETRON 8 MG/1
4 TABLET, FILM COATED ORAL EVERY 6 HOURS
Refills: 0 | Status: DISCONTINUED | OUTPATIENT
Start: 2021-04-22 | End: 2021-04-22

## 2021-04-22 RX ADMIN — FAMOTIDINE 20 MILLIGRAM(S): 10 INJECTION INTRAVENOUS at 06:56

## 2021-04-22 RX ADMIN — OXYCODONE HYDROCHLORIDE 5 MILLIGRAM(S): 5 TABLET ORAL at 07:56

## 2021-04-22 RX ADMIN — ONDANSETRON 4 MILLIGRAM(S): 8 TABLET, FILM COATED ORAL at 13:35

## 2021-04-22 RX ADMIN — OXYCODONE HYDROCHLORIDE 5 MILLIGRAM(S): 5 TABLET ORAL at 12:22

## 2021-04-22 RX ADMIN — Medication 81 MILLIGRAM(S): at 06:56

## 2021-04-22 RX ADMIN — Medication 1 TABLET(S): at 11:19

## 2021-04-22 RX ADMIN — OXYCODONE HYDROCHLORIDE 5 MILLIGRAM(S): 5 TABLET ORAL at 06:56

## 2021-04-22 RX ADMIN — OXYCODONE HYDROCHLORIDE 5 MILLIGRAM(S): 5 TABLET ORAL at 11:22

## 2021-04-22 NOTE — DISCHARGE NOTE PROVIDER - HOSPITAL COURSE
Admitted to Orthopedic service Dr. Watson for right patella fracture  plan for non-operative treatment of fracture  in hinged knee brace   Pain control  DVT prophylaxis  OOB/Physical Therapy

## 2021-04-22 NOTE — PROGRESS NOTE ADULT - ASSESSMENT
61yFemale with PMH of diverticulosis presents with R knee pain after mechanical fall and diagnosed to have Rt patellar communited fracture. Ortho advised medical management with brace. Pt denies any new complaints    A and P  R patellaar communited fracture. cont hinged knee brace   dvt prophylaxis  Pain management

## 2021-04-22 NOTE — DISCHARGE NOTE PROVIDER - NSDCMRMEDTOKEN_GEN_ALL_CORE_FT
aspirin 81 mg oral delayed release tablet: 1 tab(s) orally 2 times a day  omeprazole 40 mg oral delayed release capsule: 1 cap(s) orally once a day  oxyCODONE 5 mg oral tablet: 1 tab(s) orally every 4-6 hours as needed for severe pain MDD:4   aspirin 81 mg oral delayed release tablet: 1 tab(s) orally 2 times a day  omeprazole 40 mg oral delayed release capsule: 1 cap(s) orally once a day  oxyCODONE 5 mg oral tablet: 1 tab(s) orally every 4-6 hours as needed for severe pain MDD:4  Zofran 4 mg oral tablet: 1 tab(s) orally every 8 hours  as needed for nausea/vomiting

## 2021-04-22 NOTE — PROGRESS NOTE ADULT - ATTENDING COMMENTS
Patient seen and examined.  Now has adequate pain control and with appropriate immobilization demonstrating ability to ambulate 50 ft with assistive devices.  Plan for discharge today.  Followup in [facility] orthopedic clinic in 10-14 days.

## 2021-04-22 NOTE — DISCHARGE NOTE PROVIDER - NSDCFUADDINST_GEN_ALL_CORE_FT
You are weight bearing as tolerated in hinged knee brace right lower extremity.   Keep ace/brace clean and dry.   No strenuous activity, heavy lifting, driving or returning to work until cleared by MD.  Try to have regular bowel movements, take stool softener or laxative if necessary.  Swelling may travel all the way down leg to foot, this is normal and will subside in [place] for follow up.  Contact your doctor if you experience: fever greater than 101.5, chills, chest pain, difficulty breathing, redness or excessive drainage around the incision, other concerns.  Follow up with your primary care provider.

## 2021-04-22 NOTE — DISCHARGE NOTE PROVIDER - CARE PROVIDER_API CALL
Jose Watson)  Orthopaedic Surgery  [address], [address] [place] [address]  Phone: [phone]  Fax: [phone]  Follow Up Time:

## 2021-04-22 NOTE — DISCHARGE NOTE NURSING/CASE MANAGEMENT/SOCIAL WORK - PATIENT PORTAL LINK FT
You can access the FollowMyHealth Patient Portal offered by [facility] by registering at the following website: [email or web address]. By joining [employer]s FollowMyHealth portal, you will also be able to view your health information using other applications (apps) compatible with our system.

## 2021-04-22 NOTE — DISCHARGE NOTE PROVIDER - NSDCCPCAREPLAN_GEN_ALL_CORE_FT
PRINCIPAL DISCHARGE DIAGNOSIS  Diagnosis: Closed displaced comminuted fracture of right patella, initial encounter  Assessment and Plan of Treatment:

## 2021-04-22 NOTE — PROGRESS NOTE ADULT - SUBJECTIVE AND OBJECTIVE BOX
Orthopaedic Surgery Progress Note    Subjective:     Patient seen and examined. Patient comfortable, pain controlled. Denies chest pain, shortness of breath, nausea/vomiting, numbness/tingling.    Objective:  Vital Signs Last 24 Hrs  T(C): 36.8 (22 Apr 2021 06:00), Max: 36.8 (22 Apr 2021 06:00)  T(F): 98.2 (22 Apr 2021 06:00), Max: 98.2 (22 Apr 2021 06:00)  HR: 75 (22 Apr 2021 06:00) (75 - 88)  BP: 125/81 (22 Apr 2021 06:00) (113/74 - 148/86)  BP(mean): --  RR: 16 (22 Apr 2021 06:00) (16 - 17)  SpO2: 98% (22 Apr 2021 06:00) (95% - 98%)    PE:  General: Patient alert and oriented, NAD  Knee immobilizer in place on right leg, swelling present with tenderness over patella   Pulses: 2+ DP BLE   Sensation: SILT BLE   Motor: EHL/FHL/TA/GS 5/5 BLE   Unable to SLR R leg due to pain and inability               A/P: 60yo Female with a right patella fracture   -Pain control as needed  -DVT prophylaxis: ASA   -PT, weight-bearing status: WBAT in hinged knee brace.  -Ambulated with PT 50 feet yesterday; will pursue non-op management    Ortho Pager [phone]
SABRINA KAHN  61y  Female    Patient is a 61y old  Female who presents with a chief complaint of right patella fracture  (22 Apr 2021 12:44)      HPI:  For Surgeon: Dr. Watson    HPI:  61yFemale with PMH of diverticulosis presents with R knee pain after mechanical fall. Pt was exiting elevator and tripped on her shoelace. Was immediately unable to bear weight on that limb. In ED, x-rays showed comminuted but minimally displaced patella fracture. Denies any numbness or tingling. Denies injury elswhere.   (21 Apr 2021 10:11)      PAST MEDICAL & SURGICAL HISTORY:  Gastritis    CHF (congestive heart failure)    Diverticulosis    H/O: hysterectomy  2002        Home Medications:  omeprazole 40 mg oral delayed release capsule: 1 cap(s) orally once a day (27 Feb 2018 18:23)      61y    FAMILY HISTORY:  No pertinent family history in first degree relatives        Marital Status:  (   )    (   ) Single    (   )    (  )   Lives with: (  ) alone  (  ) children   (  ) spouse   (  ) parents  (  ) other  Recent Travel: No recent travel  Occupation:    Substance Use (street drugs): ( x ) never used  (  ) other:  Tobacco Usage:  ( x  ) never smoked   (   ) former smoker   (   ) current smoker  (     ) pack year  Alcohol Usage: None       MEDICATIONS  (STANDING):  aspirin enteric coated 81 milliGRAM(s) Oral two times a day  famotidine    Tablet 20 milliGRAM(s) Oral every 12 hours  multivitamin 1 Tablet(s) Oral daily    MEDICATIONS  (PRN):  diphenhydrAMINE 25 milliGRAM(s) Oral at bedtime PRN Insomnia  HYDROmorphone  Injectable 0.5 milliGRAM(s) IV Push every 6 hours PRN Breakthrough  magnesium hydroxide Suspension 30 milliLiter(s) Oral daily PRN Constipation  ondansetron    Tablet 4 milliGRAM(s) Oral every 6 hours PRN Nausea and/or Vomiting  oxyCODONE    IR 10 milliGRAM(s) Oral every 4 hours PRN Severe Pain (7 - 10)  oxyCODONE    IR 5 milliGRAM(s) Oral every 4 hours PRN Moderate Pain (4 - 6)    REVIEW OF SYSTEMS:  CONSTITUTIONAL: No fever, weight loss, or fatigue  EYES: No eye pain, visual disturbances, or discharge  ENMT:  No difficulty hearing, tinnitus, vertigo; No sinus or throat pain  NECK: No pain or stiffness  BREASTS: No pain, masses, or nipple discharge  RESPIRATORY: No cough, wheezing, chills or hemoptysis; No shortness of breath  CARDIOVASCULAR: No chest pain, palpitations, dizziness, or leg swelling  GASTROINTESTINAL: No abdominal or epigastric pain. No nausea, vomiting, or hematemesis; No diarrhea or constipation. No melena or hematochezia.  GENITOURINARY: No dysuria, frequency, hematuria, or incontinence  NEUROLOGICAL: No headaches, memory loss, loss of strength, numbness, or tremors  SKIN: No itching, burning, rashes, or lesions   LYMPH NODES: No enlarged glands  ENDOCRINE: No heat or cold intolerance; No hair loss  MUSCULOSKELETAL: No joint pain or swelling; No muscle, back, or extremity pain  PSYCHIATRIC: No depression, anxiety, mood swings, or difficulty sleeping    Vital Signs Last 24 Hrs  T(C): 36.4 (22 Apr 2021 08:38), Max: 36.8 (22 Apr 2021 06:00)  T(F): 97.6 (22 Apr 2021 08:38), Max: 98.2 (22 Apr 2021 06:00)  HR: 81 (22 Apr 2021 13:00) (68 - 86)  BP: 146/87 (22 Apr 2021 13:00) (124/76 - 146/87)  BP(mean): --  RR: 13 (22 Apr 2021 08:38) (13 - 16)  SpO2: 95% (22 Apr 2021 13:00) (95% - 98%)    PHYSICAL EXAM:  GENERAL: NAD, well-groomed, well-developed  HEAD:  Atraumatic, Normocephalic  EYES: EOMI, PERRLA, conjunctiva and sclera clear  ENMT: No tonsillar erythema, exudates, or enlargement; Moist mucous membranes, Good dentition, No lesions  NECK: Supple, No JVD, Normal thyroid  NERVOUS SYSTEM:  Alert & Oriented X3, Good concentration; Motor Strength 5/5 B/L upper and lower extremities; DTRs 2+ intact and symmetric  CHEST/LUNG: Clear to percussion bilaterally; No rales, rhonchi, wheezing, or rubs  HEART: Regular rate and rhythm; No murmurs, rubs, or gallops  ABDOMEN: Soft, Nontender, Nondistended; Bowel sounds present  EXTREMITIES: Rt knee swelling and tenderness  LYMPH: No lymphadenopathy noted  SKIN: No rashes or lesions    Consultant(s) Notes Reviewed:  [x ] YES  [ ] NO  Care Discussed with Consultants/Other Providers [ x] YES  [ ] NO    LABS:                        11.4   4.58  )-----------( 182      ( 21 Apr 2021 11:09 )             35.6     04-21    139  |  106  |  14  ----------------------------<  102<H>  3.6   |  23  |  0.58    Ca    8.6      21 Apr 2021 11:09    TPro  7.6  /  Alb  3.8  /  TBili  0.2  /  DBili  x   /  AST  22  /  ALT  38  /  AlkPhos  117  04-20    PT/INR - ( 20 Apr 2021 20:56 )   PT: 12.2 sec;   INR: 1.02          PTT - ( 20 Apr 2021 20:56 )  PTT:35.3 sec  Urinalysis Basic - ( 21 Apr 2021 07:00 )    Color: Yellow / Appearance: Clear / SG: >=1.030 / pH: x  Gluc: x / Ketone: NEGATIVE  / Bili: Negative / Urobili: 0.2 E.U./dL   Blood: x / Protein: NEGATIVE mg/dL / Nitrite: NEGATIVE   Leuk Esterase: NEGATIVE / RBC: x / WBC x   Sq Epi: x / Non Sq Epi: x / Bacteria: x      CAPILLARY BLOOD GLUCOSE          Culture - Urine (collected 21 Apr 2021 07:18)  Source: .Urine Clean Catch (Midstream)  Final Report (22 Apr 2021 09:14):    Specimen appears CONTAMINATED. Lab suggests repeat clean catch specimen.        RADIOLOGY & ADDITIONAL TESTS:  Echo:        LVSF:        EF:        RVSF:        LA:        RA:        Mitral Valve:        Aortic Valve:       Tricuspid Valve:        Pulmonic Valve:        Pericardium:   Imaging Personally Reviewed:  [ ] YES  [ ] NO          
Orthopaedic Surgery Progress Note    61F with right patella fracture     Subjective:     Patient seen and examined. Patient comfortable without complaints, pain controlled.  Denies chest pain, shortness of breath    Objective:    Vital Signs Last 24 Hrs  T(C): 36.4 (04-22-21 @ 08:38), Max: 36.8 (04-22-21 @ 06:00)  T(F): 97.6 (04-22-21 @ 08:38), Max: 98.2 (04-22-21 @ 06:00)  HR: 68 (04-22-21 @ 08:38) (68 - 75)  BP: 124/76 (04-22-21 @ 08:38) (124/76 - 125/81)  BP(mean): --  RR: 13 (04-22-21 @ 08:38) (13 - 16)  SpO2: 98% (04-22-21 @ 08:38) (98% - 98%)  AVSS    PE:  General: Patient alert and oriented, NAD  Dressing: Clean/dry/intact right lower extremity hinged knee brace   Sensation: intact to bilateral lower extremities   Motor: EHL/FHL/TA/GS firing bilateral lower extremities                           11.4   4.58  )-----------( 182      ( 21 Apr 2021 11:09 )             35.6   04-21    139  |  106  |  14  ----------------------------<  102<H>  3.6   |  23  |  0.58    Ca    8.6      21 Apr 2021 11:09    TPro  7.6  /  Alb  3.8  /  TBili  0.2  /  DBili  x   /  AST  22  /  ALT  38  /  AlkPhos  117  04-20      A/P: 61yFemale with right patella fracture, plan for non-operative treatment in hinged knee brace   1. Pain control as needed  2. DVT prophylaxis:  ASA  3. PT, weight-bearing status: wbat in hinged knee brace   4. Dispo: home pending PT clearance     Ortho Pager [phone]
Orthopaedic Surgery Progress Note    Subjective:     Patient seen and examined. Patient comfortable, pain controlled. Denies chest pain, shortness of breath, nausea/vomiting, numbness/tingling.    Objective:    Vital Signs Last 24 Hrs  T(C): 36.7 (04-21-21 @ 08:42), Max: 36.9 (04-21-21 @ 06:11)  T(F): 98 (04-21-21 @ 08:42), Max: 98.5 (04-21-21 @ 06:11)  HR: 78 (04-21-21 @ 08:42) (66 - 78)  BP: 113/74 (04-21-21 @ 08:42) (113/74 - 120/77)  RR: 17 (04-21-21 @ 08:42) (16 - 17)  SpO2: 95% (04-21-21 @ 08:42) (95% - 97%)  AVSS    PE:  General: Patient alert and oriented, NAD  Knee immobilizer in place on right leg, swelling present with tenderness over patella   Pulses: 2+ DP BLE   Sensation: SILT BLE   Motor: EHL/FHL/TA/GS 5/5 BLE   Unable to SLR R leg due to pain and inability                          11.4   4.58  )-----------( 182      ( 21 Apr 2021 11:09 )             35.6     04-21    139  |  106  |  14  ----------------------------<  102<H>  3.6   |  23  |  0.58    Ca    8.6      21 Apr 2021 11:09    TPro  7.6  /  Alb  3.8  /  TBili  0.2  /  DBili  x   /  AST  22  /  ALT  38  /  AlkPhos  117  04-20    PT/INR - ( 20 Apr 2021 20:56 )   PT: 12.2 sec;   INR: 1.02          PTT - ( 20 Apr 2021 20:56 )  PTT:35.3 sec    A/P: 62yo Female with a right patella fracture   1. Pain control as needed  2. DVT prophylaxis: ASA   3. PT, weight-bearing status: WBAT in hinged knee brace  4. Dispo: OR tomorrow depending on PT and pain control    Ortho Pager [phone]

## 2021-04-29 DIAGNOSIS — D64.9 ANEMIA, UNSPECIFIED: ICD-10-CM

## 2021-04-29 DIAGNOSIS — K21.9 GASTRO-ESOPHAGEAL REFLUX DISEASE WITHOUT ESOPHAGITIS: ICD-10-CM
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
## 2021-04-29 DIAGNOSIS — S82.041A DISPLACED COMMINUTED FRACTURE OF RIGHT PATELLA, INITIAL ENCOUNTER FOR CLOSED FRACTURE: ICD-10-CM

## 2021-04-29 DIAGNOSIS — K57.90 DIVERTICULOSIS OF INTESTINE, PART UNSPECIFIED, WITHOUT PERFORATION OR ABSCESS WITHOUT BLEEDING: ICD-10-CM
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
## 2021-04-29 PROBLEM — Z00.00 ENCOUNTER FOR PREVENTIVE HEALTH EXAMINATION: Status: ACTIVE | Noted: 2021-04-29

## 2021-05-03 ENCOUNTER — OUTPATIENT (OUTPATIENT)
Dept: OUTPATIENT SERVICES | Facility: HOSPITAL | Age: 61
LOS: 1 days | End: 2021-05-03
Payer: COMMERCIAL

## 2021-05-03 ENCOUNTER — RESULT REVIEW (OUTPATIENT)
Age: 61
End: 2021-05-03

## 2021-05-03 ENCOUNTER — APPOINTMENT (OUTPATIENT)
Dept: ORTHOPEDIC SURGERY | Facility: CLINIC | Age: 61
End: 2021-05-03
Payer: MEDICAID

## 2021-05-03 VITALS — BODY MASS INDEX: 33.57 KG/M2 | WEIGHT: 171 LBS | HEIGHT: 60 IN | RESPIRATION RATE: 16 BRPM

## 2021-05-03 DIAGNOSIS — Z78.9 OTHER SPECIFIED HEALTH STATUS: ICD-10-CM

## 2021-05-03 DIAGNOSIS — Z86.39 PERSONAL HISTORY OF OTHER ENDOCRINE, NUTRITIONAL AND METABOLIC DISEASE: ICD-10-CM

## 2021-05-03 DIAGNOSIS — Z98.890 OTHER SPECIFIED POSTPROCEDURAL STATES: Chronic | ICD-10-CM

## 2021-05-03 PROCEDURE — 73560 X-RAY EXAM OF KNEE 1 OR 2: CPT | Mod: 26,RT

## 2021-05-03 PROCEDURE — 73560 X-RAY EXAM OF KNEE 1 OR 2: CPT

## 2021-05-03 PROCEDURE — 99024 POSTOP FOLLOW-UP VISIT: CPT

## 2021-05-03 RX ORDER — ASPIRIN 81 MG/1
81 TABLET ORAL
Qty: 28 | Refills: 0 | Status: ACTIVE | COMMUNITY
Start: 2021-05-03 | End: 1900-01-01

## 2021-05-21 NOTE — ED ADULT NURSE NOTE - NSSISCREENINGQ1_ED_A_ED
Quality 111:Pneumonia Vaccination Status For Older Adults: Pneumococcal Vaccination Previously Received
Quality 226: Preventive Care And Screening: Tobacco Use: Screening And Cessation Intervention: Patient screened for tobacco use and is an ex/non-smoker
Quality 431: Preventive Care And Screening: Unhealthy Alcohol Use - Screening: Patient screened for unhealthy alcohol use using a single question and scores less than 2 times per year
Detail Level: Detailed
No

## 2021-05-24 ENCOUNTER — RESULT REVIEW (OUTPATIENT)
Age: 61
End: 2021-05-24

## 2021-05-24 ENCOUNTER — OUTPATIENT (OUTPATIENT)
Dept: OUTPATIENT SERVICES | Facility: HOSPITAL | Age: 61
LOS: 1 days | End: 2021-05-24
Payer: COMMERCIAL

## 2021-05-24 ENCOUNTER — APPOINTMENT (OUTPATIENT)
Dept: ORTHOPEDIC SURGERY | Facility: CLINIC | Age: 61
End: 2021-05-24
Payer: MEDICAID

## 2021-05-24 DIAGNOSIS — Z98.890 OTHER SPECIFIED POSTPROCEDURAL STATES: Chronic | ICD-10-CM

## 2021-05-24 PROCEDURE — 73560 X-RAY EXAM OF KNEE 1 OR 2: CPT | Mod: 26,RT

## 2021-05-24 PROCEDURE — 99024 POSTOP FOLLOW-UP VISIT: CPT

## 2021-05-24 PROCEDURE — 73560 X-RAY EXAM OF KNEE 1 OR 2: CPT

## 2021-05-24 NOTE — HISTORY OF PRESENT ILLNESS
[de-identified] : Follow-up stellate right patella fracture [de-identified] : 61-year-old female presents in follow-up for her stellate right patella fracture.  This is a hospital follow-up.  Initial evaluation in the emergency room patient had difficulty with weightbearing and x-rays demonstrating the stellate fracture with a secondary congruence.  She was admitted to the hospital for a trial of physical therapy and ambulation was actually able to clear PT.  She has been discharged with a range of motion knee brace locked in extension.  Today states she has been following all instructions with the brace.  Pain is mild to moderate.  She is accompanied by her daughter. [de-identified] : Focal examination of the right knee demonstrates skin to be in good condition without any abrasions or lacerations.  No significant swelling.  Brace is well fitted and appropriately controlled.  She is neurovascularly intact.  With the leg in full extension of the brace locked straight she is able to form a straight leg raise.  She is ambulating safely with a walker. [de-identified] : X-rays:\par Right knee x-rays were performed and confirm maintenance of secondary congruence of the patella fracture with no evidence of diastases or loss of extensor mechanism integrity [de-identified] : Right knee stellate patellar fracture [de-identified] : Patient with a right knee stellate patellar fracture as described.  Risks and benefits of continued conservative management were again reviewed.  Patient would like to proceed with bracing as described.  She will follow-up in 3 to 4 weeks with repeat x-rays to ensure reduction is maintained.  Will be can begin progression of her nonweightbearing range of motion today in 30 degree increments such that over the next 2 weeks she can go 0 to 30degrees then subsequently 0 to 60 degrees and subsequently 0 to 90 degrees.  Patient will remain locked in full extension with weightbearing at all times.\par \par X-rays at follow-up: AP and full extension lateral right knee

## 2021-06-14 ENCOUNTER — RESULT REVIEW (OUTPATIENT)
Age: 61
End: 2021-06-14

## 2021-06-14 ENCOUNTER — OUTPATIENT (OUTPATIENT)
Dept: OUTPATIENT SERVICES | Facility: HOSPITAL | Age: 61
LOS: 1 days | End: 2021-06-14
Payer: COMMERCIAL

## 2021-06-14 ENCOUNTER — APPOINTMENT (OUTPATIENT)
Dept: ORTHOPEDIC SURGERY | Facility: CLINIC | Age: 61
End: 2021-06-14
Payer: MEDICAID

## 2021-06-14 VITALS — BODY MASS INDEX: 33.57 KG/M2 | HEIGHT: 60 IN | WEIGHT: 171 LBS

## 2021-06-14 DIAGNOSIS — Z98.890 OTHER SPECIFIED POSTPROCEDURAL STATES: Chronic | ICD-10-CM

## 2021-06-14 PROCEDURE — 73560 X-RAY EXAM OF KNEE 1 OR 2: CPT | Mod: 26,RT

## 2021-06-14 PROCEDURE — 99024 POSTOP FOLLOW-UP VISIT: CPT

## 2021-06-14 PROCEDURE — 73560 X-RAY EXAM OF KNEE 1 OR 2: CPT

## 2021-06-15 NOTE — HISTORY OF PRESENT ILLNESS
[de-identified] : DOS: 4-22-21\par Right  patella fracture  [de-identified] : Ms. SABRINA KAHN is a very pleasant 61 year female who presents today for a follow-up post-op about 2 months from injury.  Patient was doing quite well initially with her close management but did contact our office several weeks ago after a fall in the bathroom.  Patient had a fall in the bathroom where she extended the right knee in brace and thinks she heard something tear.  Patient was seen at an outside hospital emergency room after this fall to ensure she had not displaced her patellar fracture and by report x-rays were stable.  She was instructed to continue her close management instructions.  Patient has still been able to wlak on the right knee.  She reports increased swelling in the knee since the fall but no change in strength and is still able to maintain leg in extended position.  Denies numbness, tingling weakness or decreased sensation in the right lower extremity\par \par \par May 24, 2021\par  61 year old female  who presents today 4 weeks s/p injury of right  patella fx. patient has been following protocol and rehabbing.  She is progressed range of motion brace to 30° over the last 2 weeks and is tolerating.  She continues to have pain at the inferior pole which progresses along the lateral retinaculum.  She is able to ambulate with her crutches and her range of motion brace locked straight.\par \par \par May \par 61-year-old female presents in follow-up for her stellate right patella fracture. This is a hospital follow-up. Initial evaluation in the emergency room patient had difficulty with weightbearing and x-rays demonstrating the stellate fracture with a secondary congruence. She was admitted to the hospital for a trial of physical therapy and ambulation was actually able to clear PT. She has been discharged with a range of motion knee brace locked in extension. Today states she has been following all instructions with the brace. Pain is mild to moderate. She is accompanied by her daughter. \par  \par  [de-identified] : Small effusion. \par Active extension was not tested due to given the setting of her fracture.  Remainder of exam is relatively benign.  Good quadriceps recruitment but quad atrophy and deconditioning with mild atrophy.  Patient is currently able to ambulate with minimal antalgia utilizing heel toe pattern with her range of motion knee brace locked straight. [de-identified] : Jun 14, 2021\par X-Rays AP & lateral in full extension of the right  knee demonstrates: Unchanged alignment from prior right knee AP and lateral x-rays with maintained secondary congruence no additional diastases at the distal pole fracture location.\par \par \par \par May 24, 2021\par X-Rays AP & lateral in full extension of the right  knee demonstrates:\par Unchanged alignment from prior right knee AP and lateral x-rays with maintained secondary congruence no additional diastases at the distal pole fracture location\par May 03, 2021\par X-Rays; .Left knee x-rays were performed and confirm maintenance of secondary congruence of the patella fracture with no evidence of diastases or loss of extensor mechanism integrity. \par  [de-identified] : 61 year old female, s/p Right  patella fracture\par \par \par  [de-identified] : Patient can start to walk without the knee brace but should still use cane until she gets comfortable with walking without brace\par \par - PT script provided today for quad strengthening. Patient was discharged from her at home therapy.\par \par Follow-up in 6 weeks\par X-rays: Right knee xrays

## 2021-07-26 ENCOUNTER — APPOINTMENT (OUTPATIENT)
Dept: ORTHOPEDIC SURGERY | Facility: CLINIC | Age: 61
End: 2021-07-26
Payer: MEDICAID

## 2021-07-26 ENCOUNTER — RESULT REVIEW (OUTPATIENT)
Age: 61
End: 2021-07-26

## 2021-07-26 ENCOUNTER — OUTPATIENT (OUTPATIENT)
Dept: OUTPATIENT SERVICES | Facility: HOSPITAL | Age: 61
LOS: 1 days | End: 2021-07-26
Payer: COMMERCIAL

## 2021-07-26 VITALS — RESPIRATION RATE: 16 BRPM | WEIGHT: 171 LBS | BODY MASS INDEX: 28.49 KG/M2 | HEIGHT: 65 IN

## 2021-07-26 DIAGNOSIS — Z98.890 OTHER SPECIFIED POSTPROCEDURAL STATES: Chronic | ICD-10-CM

## 2021-07-26 PROCEDURE — 73560 X-RAY EXAM OF KNEE 1 OR 2: CPT

## 2021-07-26 PROCEDURE — 73560 X-RAY EXAM OF KNEE 1 OR 2: CPT | Mod: 26,RT

## 2021-07-26 PROCEDURE — 99213 OFFICE O/P EST LOW 20 MIN: CPT

## 2021-08-20 NOTE — ED PROVIDER NOTE - CONDUCTED A DETAILED DISCUSSION WITH PATIENT AND/OR GUARDIAN REGARDING, MDM
Quality 226: Preventive Care And Screening: Tobacco Use: Screening And Cessation Intervention: Patient screened for tobacco use and is an ex/non-smoker Detail Level: Detailed Quality 130: Documentation Of Current Medications In The Medical Record: Current Medications Documented Quality 431: Preventive Care And Screening: Unhealthy Alcohol Use - Screening: Patient screened for unhealthy alcohol use using a single question and scores less than 2 times per year lab results/return to ED if symptoms worsen, persist or questions arise/need for outpatient follow-up/radiology results

## 2021-09-27 ENCOUNTER — RESULT REVIEW (OUTPATIENT)
Age: 61
End: 2021-09-27

## 2021-09-27 ENCOUNTER — OUTPATIENT (OUTPATIENT)
Dept: OUTPATIENT SERVICES | Facility: HOSPITAL | Age: 61
LOS: 1 days | End: 2021-09-27
Payer: COMMERCIAL

## 2021-09-27 ENCOUNTER — APPOINTMENT (OUTPATIENT)
Dept: ORTHOPEDIC SURGERY | Facility: CLINIC | Age: 61
End: 2021-09-27
Payer: MEDICAID

## 2021-09-27 VITALS — RESPIRATION RATE: 16 BRPM | HEIGHT: 65 IN | BODY MASS INDEX: 28.49 KG/M2 | WEIGHT: 171 LBS

## 2021-09-27 DIAGNOSIS — Z98.890 OTHER SPECIFIED POSTPROCEDURAL STATES: Chronic | ICD-10-CM

## 2021-09-27 PROCEDURE — 73560 X-RAY EXAM OF KNEE 1 OR 2: CPT | Mod: 26,RT

## 2021-09-27 PROCEDURE — 99213 OFFICE O/P EST LOW 20 MIN: CPT

## 2021-09-27 PROCEDURE — 73560 X-RAY EXAM OF KNEE 1 OR 2: CPT

## 2022-06-01 ENCOUNTER — APPOINTMENT (OUTPATIENT)
Dept: ORTHOPEDIC SURGERY | Facility: CLINIC | Age: 62
End: 2022-06-01
Payer: MEDICAID

## 2022-06-01 VITALS — BODY MASS INDEX: 28.49 KG/M2 | WEIGHT: 171 LBS | RESPIRATION RATE: 16 BRPM | HEIGHT: 65 IN

## 2022-06-01 DIAGNOSIS — M25.562 PAIN IN LEFT KNEE: ICD-10-CM

## 2022-06-01 DIAGNOSIS — M17.11 UNILATERAL PRIMARY OSTEOARTHRITIS, RIGHT KNEE: ICD-10-CM

## 2022-06-01 DIAGNOSIS — S82.044D NONDISPLACED COMMINUTED FRACTURE OF RIGHT PATELLA, SUBSEQUENT ENCOUNTER FOR CLOSED FRACTURE WITH ROUTINE HEALING: ICD-10-CM

## 2022-06-01 PROCEDURE — 99214 OFFICE O/P EST MOD 30 MIN: CPT | Mod: 25

## 2022-06-01 PROCEDURE — 20610 DRAIN/INJ JOINT/BURSA W/O US: CPT | Mod: 50

## 2022-06-01 RX ORDER — CELECOXIB 100 MG/1
100 CAPSULE ORAL
Qty: 30 | Refills: 2 | Status: ACTIVE | COMMUNITY
Start: 2022-06-01 | End: 1900-01-01

## 2022-06-02 PROBLEM — S82.044D CLOSED NONDISPLACED COMMINUTED FRACTURE OF RIGHT PATELLA WITH ROUTINE HEALING, SUBSEQUENT ENCOUNTER: Status: ACTIVE | Noted: 2021-05-03

## 2022-06-02 PROBLEM — M17.11 ARTHRITIS OF KNEE, RIGHT: Noted: 2022-06-01

## 2022-07-13 ENCOUNTER — APPOINTMENT (OUTPATIENT)
Dept: ORTHOPEDIC SURGERY | Facility: CLINIC | Age: 62
End: 2022-07-13

## 2022-07-13 VITALS — RESPIRATION RATE: 16 BRPM | WEIGHT: 171 LBS | BODY MASS INDEX: 28.49 KG/M2 | HEIGHT: 65 IN

## 2022-07-13 DIAGNOSIS — M72.2 PLANTAR FASCIAL FIBROMATOSIS: ICD-10-CM

## 2022-07-13 PROCEDURE — 99213 OFFICE O/P EST LOW 20 MIN: CPT

## 2022-07-13 NOTE — PHYSICAL EXAM
[de-identified] : Bilateral ankle / Foot:\par The following exam was performed on the involved extremity:\par Inspection / Palpation LE with evaluation for maximal tenderness\par Hindfoot Alignment\par Dorsiflexion normal 25 degrees\par Plantarflexion normal 50 degress\par Anterior Drawer\par Talar Tilt\par Strength LE: EHL, tibialis anterior, plantar flexion bilaterally\par Sensation: subjective normal distal sensation bilateral LE\par Vasculature: <2 second distal capillary refill bilaterally\par LE Skin: No rashes, no lesions\par DTR LE: Patellar; Achilles\par The exam above was normal except as otherwise noted below:\par \par Bilateral foot and ankle examination demonstrates mild equinus contracture.  Maximum dorsiflexion with knee in extension is 10 degrees bilaterally.  She has full plantarflexion.  With the knee flexed dorsiflexion is to 20 degrees.  She has focal tenderness palpation at the distal medial aspect of the plantar calcaneal tuberosity consistent with plantar fasciitis.  Pain is exacerbated with simultaneous dorsiflexion of the great toe.  Patient has a mild cavus deformity with elevated arch height.  Otherwise no focal abnormalities

## 2022-07-13 NOTE — DISCUSSION/SUMMARY
[de-identified] : The patient's history, physical exam and any relevant studies were reviewed with the patient at length.  We reviewed relevant treatment options including no intervention, activity modification techniques, available pharmaceuticals, physical therapy, durable medical equipment/bracing, and surgical interventions if applicable.  The risks and benefits of all treatments were reviewed, including the potential morbidity of untreated pathology.\par \par Following discussion and shared decision-making, the patient has elected to proceed with [nonoperative care].\par \par Planned Interventions: [NSAIDs, activity modification]\par \par Referrals: [Physical Therapy], consider silicone heel cups and plantar fasciitis nighttime brace, discussed shoewear modifications with decreased use of heels\par \par Additional Studies: [None]\par \par Followup: [3 months as needed for reevaluation], [or earlier if symptoms worsen or fail to improve]\par \par X-rays at followup: [None]\par

## 2022-07-13 NOTE — HISTORY OF PRESENT ILLNESS
[de-identified] : 62-year-old female previously known to us for bilateral knee osteoarthritis presents for new complaint of bilateral heel and plantar foot pain.  Pain is localized to the medial aspect of the heel with some radiation along the plantar aspect of the foot.  It is worse during the first few steps of the day and after long periods on her feet.  Patient admits that she spends large amount of time wearing heels and it is more severe when she is wearing flat shoes.  She has previously been treated for this condition and received a "complex B" injection.  She is attempted anti-inflammatories with no improvement.  She has not attempted braces or shoewear modification.

## 2022-10-31 ENCOUNTER — APPOINTMENT (OUTPATIENT)
Dept: ORTHOPEDIC SURGERY | Facility: CLINIC | Age: 62
End: 2022-10-31

## 2022-10-31 VITALS — RESPIRATION RATE: 16 BRPM | HEIGHT: 65 IN | WEIGHT: 171 LBS | BODY MASS INDEX: 28.49 KG/M2

## 2022-10-31 PROCEDURE — 20610 DRAIN/INJ JOINT/BURSA W/O US: CPT | Mod: LT

## 2022-10-31 PROCEDURE — 99213 OFFICE O/P EST LOW 20 MIN: CPT | Mod: 25

## 2022-12-07 ENCOUNTER — EMERGENCY (EMERGENCY)
Facility: HOSPITAL | Age: 62
LOS: 1 days | Discharge: HOME | End: 2022-12-07
Attending: EMERGENCY MEDICINE | Admitting: EMERGENCY MEDICINE
Payer: COMMERCIAL

## 2022-12-07 VITALS
SYSTOLIC BLOOD PRESSURE: 136 MMHG | OXYGEN SATURATION: 97 % | DIASTOLIC BLOOD PRESSURE: 81 MMHG | HEART RATE: 76 BPM | RESPIRATION RATE: 18 BRPM

## 2022-12-07 VITALS
TEMPERATURE: 98 F | OXYGEN SATURATION: 98 % | WEIGHT: 171.96 LBS | RESPIRATION RATE: 18 BRPM | HEIGHT: 60 IN | DIASTOLIC BLOOD PRESSURE: 87 MMHG | SYSTOLIC BLOOD PRESSURE: 142 MMHG | HEART RATE: 70 BPM

## 2022-12-07 DIAGNOSIS — Z98.890 OTHER SPECIFIED POSTPROCEDURAL STATES: Chronic | ICD-10-CM

## 2022-12-07 LAB
ALBUMIN SERPL ELPH-MCNC: 4.4 G/DL — SIGNIFICANT CHANGE UP (ref 3.3–5)
ALP SERPL-CCNC: 87 U/L — SIGNIFICANT CHANGE UP (ref 40–120)
ALT FLD-CCNC: 28 U/L — SIGNIFICANT CHANGE UP (ref 10–45)
ANION GAP SERPL CALC-SCNC: 6 MMOL/L — SIGNIFICANT CHANGE UP (ref 5–17)
APTT BLD: 32.9 SEC — SIGNIFICANT CHANGE UP (ref 27.5–35.5)
AST SERPL-CCNC: 21 U/L — SIGNIFICANT CHANGE UP (ref 10–40)
BASOPHILS # BLD AUTO: 0.02 K/UL — SIGNIFICANT CHANGE UP (ref 0–0.2)
BASOPHILS NFR BLD AUTO: 0.4 % — SIGNIFICANT CHANGE UP (ref 0–2)
BILIRUB SERPL-MCNC: 0.3 MG/DL — SIGNIFICANT CHANGE UP (ref 0.2–1.2)
BUN SERPL-MCNC: 10 MG/DL — SIGNIFICANT CHANGE UP (ref 7–23)
CALCIUM SERPL-MCNC: 9.4 MG/DL — SIGNIFICANT CHANGE UP (ref 8.4–10.5)
CHLORIDE SERPL-SCNC: 106 MMOL/L — SIGNIFICANT CHANGE UP (ref 96–108)
CO2 SERPL-SCNC: 30 MMOL/L — SIGNIFICANT CHANGE UP (ref 22–31)
CREAT SERPL-MCNC: 0.67 MG/DL — SIGNIFICANT CHANGE UP (ref 0.5–1.3)
EGFR: 99 ML/MIN/1.73M2 — SIGNIFICANT CHANGE UP
EOSINOPHIL # BLD AUTO: 0.14 K/UL — SIGNIFICANT CHANGE UP (ref 0–0.5)
EOSINOPHIL NFR BLD AUTO: 3 % — SIGNIFICANT CHANGE UP (ref 0–6)
GLUCOSE SERPL-MCNC: 103 MG/DL — HIGH (ref 70–99)
HCT VFR BLD CALC: 34.7 % — SIGNIFICANT CHANGE UP (ref 34.5–45)
HGB BLD-MCNC: 11.3 G/DL — LOW (ref 11.5–15.5)
IMM GRANULOCYTES NFR BLD AUTO: 0.2 % — SIGNIFICANT CHANGE UP (ref 0–0.9)
INR BLD: 1.07 — SIGNIFICANT CHANGE UP (ref 0.88–1.16)
LYMPHOCYTES # BLD AUTO: 1.92 K/UL — SIGNIFICANT CHANGE UP (ref 1–3.3)
LYMPHOCYTES # BLD AUTO: 41.7 % — SIGNIFICANT CHANGE UP (ref 13–44)
MCHC RBC-ENTMCNC: 29.7 PG — SIGNIFICANT CHANGE UP (ref 27–34)
MCHC RBC-ENTMCNC: 32.6 GM/DL — SIGNIFICANT CHANGE UP (ref 32–36)
MCV RBC AUTO: 91.1 FL — SIGNIFICANT CHANGE UP (ref 80–100)
MONOCYTES # BLD AUTO: 0.28 K/UL — SIGNIFICANT CHANGE UP (ref 0–0.9)
MONOCYTES NFR BLD AUTO: 6.1 % — SIGNIFICANT CHANGE UP (ref 2–14)
NEUTROPHILS # BLD AUTO: 2.23 K/UL — SIGNIFICANT CHANGE UP (ref 1.8–7.4)
NEUTROPHILS NFR BLD AUTO: 48.6 % — SIGNIFICANT CHANGE UP (ref 43–77)
NRBC # BLD: 0 /100 WBCS — SIGNIFICANT CHANGE UP (ref 0–0)
PLATELET # BLD AUTO: 178 K/UL — SIGNIFICANT CHANGE UP (ref 150–400)
POTASSIUM SERPL-MCNC: 4.4 MMOL/L — SIGNIFICANT CHANGE UP (ref 3.5–5.3)
POTASSIUM SERPL-SCNC: 4.4 MMOL/L — SIGNIFICANT CHANGE UP (ref 3.5–5.3)
PROT SERPL-MCNC: 7.2 G/DL — SIGNIFICANT CHANGE UP (ref 6–8.3)
PROTHROM AB SERPL-ACNC: 12.7 SEC — SIGNIFICANT CHANGE UP (ref 10.5–13.4)
RBC # BLD: 3.81 M/UL — SIGNIFICANT CHANGE UP (ref 3.8–5.2)
RBC # FLD: 13.3 % — SIGNIFICANT CHANGE UP (ref 10.3–14.5)
SODIUM SERPL-SCNC: 142 MMOL/L — SIGNIFICANT CHANGE UP (ref 135–145)
WBC # BLD: 4.6 K/UL — SIGNIFICANT CHANGE UP (ref 3.8–10.5)
WBC # FLD AUTO: 4.6 K/UL — SIGNIFICANT CHANGE UP (ref 3.8–10.5)

## 2022-12-07 PROCEDURE — 93005 ELECTROCARDIOGRAM TRACING: CPT

## 2022-12-07 PROCEDURE — 85025 COMPLETE CBC W/AUTO DIFF WBC: CPT

## 2022-12-07 PROCEDURE — 82550 ASSAY OF CK (CPK): CPT

## 2022-12-07 PROCEDURE — 71045 X-RAY EXAM CHEST 1 VIEW: CPT | Mod: 26

## 2022-12-07 PROCEDURE — 80053 COMPREHEN METABOLIC PANEL: CPT

## 2022-12-07 PROCEDURE — 71045 X-RAY EXAM CHEST 1 VIEW: CPT

## 2022-12-07 PROCEDURE — 96374 THER/PROPH/DIAG INJ IV PUSH: CPT | Mod: XU

## 2022-12-07 PROCEDURE — 70496 CT ANGIOGRAPHY HEAD: CPT | Mod: 26,MA

## 2022-12-07 PROCEDURE — 36415 COLL VENOUS BLD VENIPUNCTURE: CPT

## 2022-12-07 PROCEDURE — 70498 CT ANGIOGRAPHY NECK: CPT | Mod: MA

## 2022-12-07 PROCEDURE — 85610 PROTHROMBIN TIME: CPT

## 2022-12-07 PROCEDURE — 93010 ELECTROCARDIOGRAM REPORT: CPT

## 2022-12-07 PROCEDURE — 70450 CT HEAD/BRAIN W/O DYE: CPT | Mod: MA

## 2022-12-07 PROCEDURE — 70496 CT ANGIOGRAPHY HEAD: CPT | Mod: MA

## 2022-12-07 PROCEDURE — 99285 EMERGENCY DEPT VISIT HI MDM: CPT

## 2022-12-07 PROCEDURE — 70498 CT ANGIOGRAPHY NECK: CPT | Mod: 26,MA

## 2022-12-07 PROCEDURE — 84484 ASSAY OF TROPONIN QUANT: CPT

## 2022-12-07 PROCEDURE — 85730 THROMBOPLASTIN TIME PARTIAL: CPT

## 2022-12-07 PROCEDURE — 99285 EMERGENCY DEPT VISIT HI MDM: CPT | Mod: 25

## 2022-12-07 PROCEDURE — 82553 CREATINE MB FRACTION: CPT

## 2022-12-07 PROCEDURE — 83690 ASSAY OF LIPASE: CPT

## 2022-12-07 PROCEDURE — 70450 CT HEAD/BRAIN W/O DYE: CPT | Mod: 26,MA,59

## 2022-12-07 RX ORDER — ACETAMINOPHEN 500 MG
650 TABLET ORAL ONCE
Refills: 0 | Status: COMPLETED | OUTPATIENT
Start: 2022-12-07 | End: 2022-12-07

## 2022-12-07 RX ORDER — SODIUM CHLORIDE 9 MG/ML
500 INJECTION INTRAMUSCULAR; INTRAVENOUS; SUBCUTANEOUS ONCE
Refills: 0 | Status: DISCONTINUED | OUTPATIENT
Start: 2022-12-07 | End: 2022-12-07

## 2022-12-07 RX ORDER — METOCLOPRAMIDE HCL 10 MG
10 TABLET ORAL ONCE
Refills: 0 | Status: COMPLETED | OUTPATIENT
Start: 2022-12-07 | End: 2022-12-07

## 2022-12-07 RX ADMIN — Medication 650 MILLIGRAM(S): at 16:40

## 2022-12-07 RX ADMIN — Medication 104 MILLIGRAM(S): at 16:40

## 2022-12-07 NOTE — ED PROVIDER NOTE - NSFOLLOWUPINSTRUCTIONS_ED_ALL_ED_FT
Dizziness    Dizziness can manifest as a feeling of unsteadiness or light-headedness. You may feel like you are about to faint. This condition can be caused by a number of things, including medicines, dehydration, or illness. Drink enough fluid to keep your urine clear or pale yellow. Do not drink alcohol and limit your caffeine intake. Avoid quick or sudden movements.  Rise slowly from chairs and steady yourself until you feel okay. In the morning, first sit up on the side of the bed.    SEEK IMMEDIATE MEDICAL CARE IF YOU HAVE ANY OF THE FOLLOWING SYMPTOMS: vomiting, changes in your vision or speech, weakness in your arms or legs, trouble speaking or swallowing, chest pain, abdominal pain, shortness of breath, sweating, bleeding, headache, neck pain, or fever.    Vertigo    WHAT YOU NEED TO KNOW:    What is vertigo? Vertigo is a condition that causes you to feel dizzy. You may feel that you or everything around you is moving or spinning. You may also feel like you are being pulled down or toward your side.     What causes vertigo? The inner ear is filled with fluid, a nerve, and small organs. These structures help you maintain your balance. Vertigo may be caused by diseases or conditions that affect your inner ear or the part of your brain that controls balance. Any of the following can cause vertigo:     Small particles that float in the inner ear fluid move out of place and cause irritation      Ménière disease       Ear trauma      An inner ear infection      A neurologic condition such as multiple sclerosis, migraine, tumor, or stroke      Panic and anxiety disorders       Drinking a large amount of alcohol    What signs and symptoms may happen with vertigo?     Nausea or vomiting      Trouble with your balance      Sensitivity to light or sound      Weakness, slurred speech, problems seeing or moving, or increased sleepiness      Facial weakness and headache      Hearing loss, ear fullness or pain, or hearing ringing sounds      Fast, uncontrolled movement of your eyes    How is vertigo diagnosed? Your healthcare provider will ask about your symptoms. Tell your healthcare provider about past diseases, travels, activities, trauma, and medicines. Your healthcare provider may move your head in different directions. This will check to see if a problem in the inner ear is causing your vertigo. You may be asked to do some exercises that could make you dizzy. You may also need one or more of the following to find the cause of vertigo:    An electronystagmography (ENG) is done to test for problems you may have with balance or dizziness. Sticky pads with wires are placed on the skin around your eyes. The wires are connected to a machine that records information during your ENG. Warm and cool air or water is put into your ears while your eye movements are recorded. Do not drink alcohol or eat a heavy meal before this test. You may feel dizzy or nauseated after the test.      An auditory brainstem response (ABR) test is used to play a series of clicks through headsets on your ears. A machine measures how your cochlea and nerves react to the clicks.      An MRI of the brain may be used to check for problems that can cause vertigo. Do not enter the MRI room with anything metal. Metal can cause serious damage. Tell the healthcare provider if you have any metal in or on your body.    How is vertigo treated? Treatment will depend on the condition causing the vertigo. Your healthcare provider may suggest that you rest in bed or avoid certain activities for a time. You may need to decrease or stop taking medicines that are causing your vertigo. Medicines may also be prescribed to help relieve your symptoms.     How can I manage my symptoms?     Do not drive, walk without help, or operate heavy machinery when you are dizzy.       Move slowly when you move from one position to another position. Get up slowly from sitting or lying down. Sit or lie down right away if you feel dizzy.      Drink plenty of liquids. Liquids help prevent dehydration. Ask how much liquid to drink each day and which liquids are best for you.      Vestibular and balance rehabilitation therapy (VBRT) is used to teach you exercises to improve your balance and strength. These exercises may help decrease your vertigo and improve your balance. Ask for more information about this therapy.    When should I seek immediate care?     You have a headache and a stiff neck.      You have shaking chills and a fever.       You vomit over and over with no relief.       You have blood, pus, or fluid coming out of your ears.      You are confused.     When should I contact my healthcare provider?     Your symptoms do not get better with treatment.       You have questions about your condition or care.    CARE AGREEMENT:    You have the right to help plan your care. Learn about your health condition and how it may be treated. Discuss treatment options with your healthcare providers to decide what care you want to receive. You always have the right to refuse treatment.        © Copyright [employer] 2020

## 2022-12-07 NOTE — ED PROVIDER NOTE - OBJECTIVE STATEMENT
62F with a h/o GERD and diverticulosis who p/w headache off and on x 1 month. Pt states she gets a dull headache in the back right side of her head associated with dizziness and nausea. She saw her PMD and had a check up and was told her sugar was low. She has been eating frequent meals, but today while at work missed her meal and because dizzy and lightheaded again with n/v and right posterior headache. Also reports some epigastric discomfort c/w her gerd. No n/t/w in ext, no vision or speech change, no sob, no syncope, no trauma or fall. No Symptoms have now resolved. She reports she has not had imaging of her brain in the past.

## 2022-12-07 NOTE — ED PROVIDER NOTE - CLINICAL SUMMARY MEDICAL DECISION MAKING FREE TEXT BOX
62F with a h/o GERD and diverticulosis who p/w headache off and on x 1 month. Pt states she gets a dull headache in the back right side of her head associated with dizziness and nausea. She saw her PMD and had a check up and was told her sugar was low. She has been eating frequent meals, but today while at work missed her meal and because dizzy and lightheaded again with n/v and right posterior headache. Also reports some epigastric discomfort c/w her gerd. No n/t/w in ext, no vision or speech change, no sob, no syncope, no trauma or fall. No Symptoms have now resolved. She reports she has not had imaging of her brain in the past.  Prior EMR notes as well as external notes reviewed.    Pt is well-appearing on exam, VSS, no respiratory distress, no focal neuro deficits. Per my interpretation: EKG NSR, no ischemia.  Clinical picture c/f headache, migraine, vertigo, r/o VBI/ICH/mass. Plan for labs, CT head, CTA head/neck. Reglan, tylenol.     Independent interpretation of labs and imaging performed by me. Case discussed with consultant/PMD.  Shared decision making regarding plan and need for escalation of care/hospitalization discussed with patient and available family.   Pt stable on reevaluation, plan for DC with outpt follow up recommended and return precautions discussed. Pt verbalized understanding DC instruction. 62F with a h/o GERD and diverticulosis who p/w headache off and on x 1 month. Pt states she gets a dull headache in the back right side of her head associated with dizziness and nausea. She saw her PMD and had a check up and was told her sugar was low. She has been eating frequent meals, but today while at work missed her meal and because dizzy and lightheaded again with n/v and right posterior headache. Also reports some epigastric discomfort c/w her gerd. No n/t/w in ext, no vision or speech change, no sob, no syncope, no trauma or fall. No Symptoms have now resolved. She reports she has not had imaging of her brain in the past.  Prior EMR notes as well as external notes reviewed.    Pt is well-appearing on exam, VSS, no respiratory distress, no focal neuro deficits. Per my interpretation: EKG NSR, no ischemia.  Clinical picture c/f headache, migraine, vertigo, r/o VBI/ICH/mass. Plan for labs, CT head, CTA head/neck. Reglan, tylenol.     Independent interpretation of labs and imaging performed by me. labs wnl, CTs neg.  Pt stable on reevaluation, plan for DC with outpt follow up recommended and return precautions discussed. Pt verbalized understanding DC instruction.

## 2022-12-07 NOTE — ED ADULT TRIAGE NOTE - CHIEF COMPLAINT QUOTE
Pt co headache and nausea x1 day. Also reports dizziness when going from a standing to sitting position x1 week. Neuro intact in triage area. Denies fevers, chills, vomiting diarrhea, syncopal events.

## 2022-12-07 NOTE — ED PROVIDER NOTE - PATIENT PORTAL LINK FT
You can access the FollowMyHealth Patient Portal offered by [facility] by registering at the following website: [email or web address]. By joining [employer]’s FollowMyHealth portal, you will also be able to view your health information using other applications (apps) compatible with our system.

## 2022-12-07 NOTE — ED ADULT NURSE NOTE - OBJECTIVE STATEMENT
pt c/o headache and nausea x 1 day pt c/o headache and nausea intermittently x 1 month in back of head

## 2022-12-07 NOTE — ED PROVIDER NOTE - NS ED MD DISPO DISCHARGE CCDA
[address] [facility] Adult  Hospitalist Group  History and Physical    Date of Service:  10/25/2022  Primary Care Provider: Mehdi Galdamez DO  Source of information: The patient and Chart review    Chief Complaint: Diarrhea, poor appetite      History of Presenting Illness:   Nelson uV is a 65 y.o. female with past medical history of chronic kidney disease, type 2 diabetes mellitus, GERD, hypertension, hyperlipidemia, right vision loss presented to the emergency department with chief complaints of persistent diarrhea and poor appetite. Patient notes onset of symptoms starting approximate 2 days ago with loose watery nonbloody stools approximately 5-6 episodes per day, with associated nausea, poor appetite, with reduced oral intake. She also complained of mild epigastric abdominal pain rated approximately 3-5 out of a 10 which is since resolved. On arrival emergency department, initial corded vital signs temperature 97.9 °F, /85, heart rate 68, respirate 16, O2 saturation 90% room air. Blood pressure increased to 202/107. Abnormal labs included hemoglobin 10.0, blood glucose 218, BUN 42, creatinine 5.33, GFR 9, alkaline phosphatase 178. ED ordered 0.9% normal saline 1000 mL IV fluid bolus x1 and consult to hospitalist for admission. REVIEW OF SYSTEMS:  A comprehensive review of systems was negative except for that written in the History of Present Illness.      Past Medical History:   Diagnosis Date    Diabetes ([address]     Diabetes Mellitus 04/25/2011    Diabetes Mellitus 04/25/2011    GERG - Esophagitis- reflux 04/25/2011    Hypercholesteremia 04/25/2011    Hypercholesterolemia 04/25/2011    Hypertension 04/25/2011    Intertrigo 04/25/2011    Kidney disease     Leiomyoma of uterus, unspecified 04/25/2011    Unspecified visual loss 04/25/2011    rt eye      Past Surgical History:   Procedure Laterality Date    COLONOSCOPY N/A 8/23/2022    COLONOSCOPY performed by Jenny Desai MD at [facility] ENDOSCOPY       Medications:  Prior to Admission medications    Medication Sig Start Date End Date Taking? Authorizing Provider   NIFEdipine ER (ADALAT CC) 30 mg ER tablet Take 30 mg by mouth two (2) times a day. Provider, Historical   aspirin 81 mg chewable tablet Take 81 mg by mouth in the morning. Provider, Historical   bumetanide (BUMEX) 1 mg tablet Take 1 mg by mouth in the morning. Provider, Historical   sodium bicarbonate 650 mg tablet Take 650 mg by mouth two (2) times a day. Provider, Historical   insulin glargine (LANTUS,BASAGLAR) 100 unit/mL (3 mL) inpn 26 Units by SubCUTAneous route nightly. Provider, Historical   carvediloL (COREG) 12.5 mg tablet Take 25 mg by mouth two (2) times daily (with meals). Other, MD Vivi   atorvastatin (LIPITOR) 40 mg tablet TAKE 1 TABLET BY MOUTH EVERY DAY  Patient taking differently: 80 mg. 4/15/19   Anju Way., MD   NOVOFINE 32 28 gauge x 1/4\" ndle USE AS DIRECTED 12/18/17   Anju Way., MD   Blood-Glucose Meter monitoring kit CONTOUR METER; Use as directed 5/3/13   Anju Way., MD   glucose blood VI test strips (ASCENSIA CONTOUR) strip Daily 4/30/13   Anju Way., MD   glucose blood VI test strips (BLOOD GLUCOSE TEST) strip As directed   2/24/12   Anju Way., MD     Allergies   Allergen Reactions    Sulfur Hives    Amoxicillin Nausea Only    Other Medication Unknown (comments)     Zylocaine        Family history:  Family History   Problem Relation Age of Onset    Diabetes Mother     Hypertension Mother     Diabetes Father       Social History:  reports that she quit smoking about 12 years ago. Her smoking use included cigarettes. She has never used smokeless tobacco. She reports that she does not drink alcohol and does not use drugs.        Objective:   Visit Vitals  BP (!) 183/76   Pulse 67   Temp 99.4 °F (37.4 °C)   Resp 18   LMP 10/07/2012   SpO2 92%      O2 Device: None (Room air)    PHYSICAL EXAM: General:  Patient  in no acute respiratory distress. Head:  Normocephalic, without obvious abnormality, atraumatic   Eyes:  Conjunctivae/corneas clear. Pupils 2 mm reactive bilateral.   E/N/M/T: Nares normal. Septum midline. No nasal drainage or sinus tenderness  Tongue midline/ non-edematous  Clear oropharynx   Neck: Normal appearance and movements, symmetrical, trachea midline  No palpable adenopathy  No thyroid enlargement, tenderness or nodules  No carotid bruit   No JVD  Trachea midline   Lungs:   Symmetrical chest expansion and respiratory effort  Clear to auscultation bilaterally   Chest wall:  No tenderness or deformity   Heart:  Regular rate and rhythm   Normal S1 and S2; no murmur, click, rub or gallop   Abdomen:   Soft, no tenderness  No rebound, guarding, or rigidity  Non-distended   Bowel sounds normal  No masses or hepatosplenomegaly  No hernias present   Back: No costovertebral angle tenderness  No step-off deformity   Extremities: Extremities normal, atraumatic  No cyanosis   Trace bilateral lower extremity nonpitting edema     Vascular/  Pulses: 2+ radial/ 1+ DP bilateral pulses   Integument/  Skin: No rashes or ulcers  Warm and dry   Musculo-      skeletal: Gait not tested  No calf tenderness   Neuro: GCS 15. Moves all extremities x 4. No slurred speech. No facial droop. Sensation grossly intact. Psych: Alert, oriented x 3. Flat affect. Data Review: All diagnostic labs and studies have been reviewed. Abnormal Labs Reviewed   CBC WITH AUTOMATED DIFF - Abnormal; Notable for the following components:       Result Value    RBC 3.78 (*)     HGB 10.0 (*)     HCT 32.8 (*)     PLATELET 119 (*)     NEUTROPHILS 77 (*)     LYMPHOCYTES 11 (*)     ABS.  LYMPHOCYTES 0.5 (*)     All other components within normal limits   METABOLIC PANEL, COMPREHENSIVE - Abnormal; Notable for the following components:    Chloride 109 (*)     Glucose 218 (*)     BUN 42 (*)     Creatinine 5.33 (*) BUN/Creatinine ratio 8 (*)     eGFR 9 (*)     Alk. phosphatase 178 (*)     A-G Ratio 1.0 (*)     All other components within normal limits       All Micro Results       None            IMAGING:   No orders to display        ECG/ECHO:    Results for orders placed or performed during the hospital encounter of 03/23/21   EKG, 12 LEAD, INITIAL   Result Value Ref Range    Ventricular Rate 63 BPM    Atrial Rate 63 BPM    P-R Interval 130 ms    QRS Duration 100 ms    Q-T Interval 444 ms    QTC Calculation (Bezet) 454 ms    Calculated P Axis 57 degrees    Calculated R Axis 28 degrees    Calculated T Axis 111 degrees    Diagnosis       Normal sinus rhythm  Minimal voltage criteria for LVH, may be normal variant  T wave abnormality, consider lateral ischemia  No previous ECGs available  Confirmed by Mingo Kaur, P.V. ([id number]) on 3/24/2021 12:03:42 AM          Assessment / Plan:   Given the patient's current clinical presentation, there is a high level of concern for decompensation if discharged from the emergency department. Complex decision making was performed, which includes reviewing the patient's available past medical records, laboratory results, and imaging studies. Active Problems:    RAYMOND (acute kidney injury) superimposed on CKD (chronic kidney disease)  -Likely hypovolemic/dehydration with poor recent oral intake and GI losses with diarrhea  -BUN 42, creatinine 5.33, GFR 9; versus prior creatinine 3.49 on 3/23/2021  -Order 0.9% normal saline IV fluid maintenance  -Additional IV fluid boluses not ordered as patient's systolic blood pressures have been increased to the 200s  -Serum CO2 and K levels were within normal limits  -Repeat renal panel in a.m.  -Order UA  -Strict I's and O's and daily weights to monitor fluid status closely  -Order CT abdomen pelvis without IV contrast to rule out obstructive nephropathy/ renal stone        -Consult nephrologist    2.   Uncontrolled hypertension  -Order hydralazine initial 5 mg IV x1 dose. Ordered additional 10 mg IV x1 dose as blood pressure remain uncontrolled  -If blood pressure remains elevated, order labetalol 10 mg IV  -Initially held on beta-blockers due to heart rates being on the lower range  -Continue to monitor blood pressure closely and provide additional antihypertensive medications as needed  -Resume patient on home BP medication    3. Diarrhea  -Order stool occult blood test  -Order stool for C. difficile    4. Normocytic anemia  -Hemoglobin 10.0. Repeat H&H and transfuse if hemoglobin less than 7.0    5. Uncontrolled type 2 diabetes mellitus  -Order Humalog insulin correctional coverage, scheduled blood glucose checks and check hemoglobin A1c level. 6.  History of CVA  -Placed on neurochecks and fall precautions    7. Hyperlipidemia  -Check lipid panel. Resume statin therapy. 8.  Obesity  -BMI = 33.42 kg/m2  -Would encourage weight loss, heart healthy diet, lifestyle modification      DIET: ADULT DIET carb consistent  ISOLATION PRECAUTIONS: There are currently no Active Isolations    DVT PROPHYLAXIS: SCDs  FUNCTIONAL STATUS PRIOR TO HOSPITALIZATION: Fully active and ambulatory; able to carry on all self-care without restriction. Ambulatory status/function: By self   EARLY MOBILITY ASSESSMENT: Recommend routine ambulation while hospitalized with the assistance of nursing staff  ANTICIPATED DISCHARGE: Greater than 48 hours. ANTICIPATED DISPOSITION: Home with Home Healthcare      CRITICAL CARE WAS PERFORMED FOR THIS ENCOUNTER: NO.    ADVANCED DIRECTIVE/ CODE STATUS:  FULL CODE as per discussion with patient. Signed By: Naveen aDsh MD     October 25, 2022         Please note that this dictation may have been completed with Dragon, the [employer] voice recognition software. Quite often unanticipated grammatical, syntax, homophones, and other interpretive errors are inadvertently transcribed by the computer software. Please disregard these errors.   Please excuse any errors that have escaped final proofreading. Patient/Caregiver provided printed discharge information.

## 2022-12-07 NOTE — ED PROVIDER NOTE - CARE PLAN
1 Principal Discharge DX:	Headache   Principal Discharge DX:	Headache  Secondary Diagnosis:	Dizziness

## 2022-12-07 NOTE — ED PROVIDER NOTE - PHYSICAL EXAMINATION
GEN: Well appearing, well developed, awake, alert, oriented to person, place, time/situation and in no apparent distress. NTAF  ENT: Airway patent, Nasal mucosa clear. Mouth with normal mucosa.  EYES: Clear bilaterally. PERRL, EOMI. No nystagmus.  RESPIRATORY: Breathing comfortably with normal RR. No W/C/R, no hypoxia or resp distress.  CARDIAC: Regular rate and rhythm, no M/R/G  ABDOMEN: Soft, nontender, +bowel sounds, no rebound, rigidity, or guarding.  MSK: Range of motion is not limited, no deformities noted.  NEURO: Alert and oriented x 3. Cn 2-12 intact. Strength 5/5 and sensation intact in all 4 extremities. no pronator drift. FTN normal.  Gait normal.   SKIN: Skin normal color for race, warm, dry and intact. No evidence of rash.  PSYCH: Alert and oriented to person, place, time/situation. normal mood and affect. no apparent risk to self or others.

## 2022-12-09 DIAGNOSIS — R42 DIZZINESS AND GIDDINESS: ICD-10-CM

## 2022-12-09 DIAGNOSIS — R51.9 HEADACHE, UNSPECIFIED: ICD-10-CM

## 2022-12-09 DIAGNOSIS — R11.0 NAUSEA: ICD-10-CM

## 2022-12-09 DIAGNOSIS — K21.9 GASTRO-ESOPHAGEAL REFLUX DISEASE WITHOUT ESOPHAGITIS: ICD-10-CM

## 2022-12-09 DIAGNOSIS — Z79.82 LONG TERM (CURRENT) USE OF ASPIRIN: ICD-10-CM

## 2022-12-09 DIAGNOSIS — K57.90 DIVERTICULOSIS OF INTESTINE, PART UNSPECIFIED, WITHOUT PERFORATION OR ABSCESS WITHOUT BLEEDING: ICD-10-CM

## 2022-12-09 DIAGNOSIS — R10.13 EPIGASTRIC PAIN: ICD-10-CM

## 2023-01-31 NOTE — ED PROVIDER NOTE - CONTEXT
sick contacts Detail Level: Zone Render In Strict Bullet Format?: No Continue Regimen: Betamethasone bid 14/7, declines needing refills at this time\\nOtezla 30 mg bid \\nImodium as needed for GI upset Continue Regimen: Ketoconazole cream bid\\nKetoconazole shampoo

## 2023-04-12 ENCOUNTER — APPOINTMENT (OUTPATIENT)
Dept: ORTHOPEDIC SURGERY | Facility: CLINIC | Age: 63
End: 2023-04-12
Payer: MEDICAID

## 2023-04-12 PROCEDURE — 20610 DRAIN/INJ JOINT/BURSA W/O US: CPT | Mod: 50

## 2023-04-19 RX ORDER — CELECOXIB 100 MG/1
100 CAPSULE ORAL
Qty: 60 | Refills: 0 | Status: ACTIVE | COMMUNITY
Start: 2023-04-19 | End: 1900-01-01

## 2023-07-17 ENCOUNTER — APPOINTMENT (OUTPATIENT)
Dept: ORTHOPEDIC SURGERY | Facility: CLINIC | Age: 63
End: 2023-07-17

## 2023-07-26 ENCOUNTER — APPOINTMENT (OUTPATIENT)
Dept: ORTHOPEDIC SURGERY | Facility: CLINIC | Age: 63
End: 2023-07-26
Payer: MEDICAID

## 2023-07-26 VITALS — BODY MASS INDEX: 28.49 KG/M2 | RESPIRATION RATE: 16 BRPM | WEIGHT: 171 LBS | HEIGHT: 65 IN

## 2023-07-26 DIAGNOSIS — S83.521A SPRAIN OF POSTERIOR CRUCIATE LIGAMENT OF RIGHT KNEE, INITIAL ENCOUNTER: ICD-10-CM

## 2023-07-26 PROCEDURE — 99214 OFFICE O/P EST MOD 30 MIN: CPT

## 2023-07-27 ENCOUNTER — APPOINTMENT (OUTPATIENT)
Dept: OTOLARYNGOLOGY | Facility: CLINIC | Age: 63
End: 2023-07-27
Payer: MEDICAID

## 2023-07-27 VITALS
WEIGHT: 172 LBS | OXYGEN SATURATION: 96 % | DIASTOLIC BLOOD PRESSURE: 78 MMHG | HEIGHT: 65 IN | TEMPERATURE: 97.8 F | BODY MASS INDEX: 28.66 KG/M2 | HEART RATE: 63 BPM | SYSTOLIC BLOOD PRESSURE: 124 MMHG

## 2023-07-27 DIAGNOSIS — H90.3 SENSORINEURAL HEARING LOSS, BILATERAL: ICD-10-CM

## 2023-07-27 DIAGNOSIS — H61.23 IMPACTED CERUMEN, BILATERAL: ICD-10-CM

## 2023-07-27 DIAGNOSIS — H81.12 BENIGN PAROXYSMAL VERTIGO, LEFT EAR: ICD-10-CM

## 2023-07-27 PROCEDURE — 99203 OFFICE O/P NEW LOW 30 MIN: CPT | Mod: 25

## 2023-07-27 PROCEDURE — 92550 TYMPANOMETRY & REFLEX THRESH: CPT | Mod: 52

## 2023-07-27 PROCEDURE — 92557 COMPREHENSIVE HEARING TEST: CPT

## 2023-07-27 PROCEDURE — 69210 REMOVE IMPACTED EAR WAX UNI: CPT

## 2023-07-27 NOTE — PHYSICAL EXAM
[Binocular Microscopic Exam] : Binocular microscopic exam was performed [FreeTextEntry8] : obstructing cerumen removed with suction [FreeTextEntry9] : obstructing cerumen removed with suction [de-identified] : The Mcdonough Hallpike was positive for L posterior canal BPPV. An Epley maneuver was then performed x3 with relief on the last pass.\par  [Normal] : no masses and lesions seen, face is symmetric

## 2023-07-27 NOTE — ASSESSMENT
[FreeTextEntry1] : Discussed post-epley maneuver care and provided the corresponding handout. RTC further vertigo.\par \par Discussed the importance not putting qtips or other foreign objects in the ears.\par \par RTC for an ear cleaning in 6 months; annual audios, sooner prn any changes.\par

## 2023-07-27 NOTE — HISTORY OF PRESENT ILLNESS
[de-identified] : 1 yr ago developed dizziness, ear pain, vomiting. Ear pain and vomiting resolved after 4-5 days but continues to have dizziness. Worse when lying down and getting up. Denies changes to hearing or tinnitus. Dizziness lasts 5-6 second almost daily, described as sensation of spinning around. No history of headaches, cardiovascular disease, DM.

## 2023-10-11 ENCOUNTER — APPOINTMENT (OUTPATIENT)
Dept: ORTHOPEDIC SURGERY | Facility: CLINIC | Age: 63
End: 2023-10-11

## 2023-10-16 ENCOUNTER — APPOINTMENT (OUTPATIENT)
Dept: ORTHOPEDIC SURGERY | Facility: CLINIC | Age: 63
End: 2023-10-16

## 2023-10-24 ENCOUNTER — APPOINTMENT (OUTPATIENT)
Dept: DERMATOLOGY | Facility: CLINIC | Age: 63
End: 2023-10-24

## 2024-01-22 ENCOUNTER — APPOINTMENT (OUTPATIENT)
Dept: ORTHOPEDIC SURGERY | Facility: CLINIC | Age: 64
End: 2024-01-22
Payer: MEDICAID

## 2024-01-22 VITALS — HEIGHT: 65 IN | BODY MASS INDEX: 28.66 KG/M2 | RESPIRATION RATE: 16 BRPM | WEIGHT: 172 LBS

## 2024-01-22 DIAGNOSIS — M76.32 ILIOTIBIAL BAND SYNDROME, LEFT LEG: ICD-10-CM

## 2024-01-22 PROCEDURE — 99213 OFFICE O/P EST LOW 20 MIN: CPT | Mod: 25

## 2024-01-22 PROCEDURE — 20610 DRAIN/INJ JOINT/BURSA W/O US: CPT | Mod: LT

## 2024-02-02 NOTE — ED ADULT NURSE NOTE - NS ED NURSE RECORD ANOTHER VITAL SIGN
Medication Refill Request    LOV 1/2/2024  NOV 4/9/2024    Lab Results   Component Value Date    CREATININE 0.9 11/20/2023     The patient sent a [employer] message requesting refills.   Yes

## 2024-03-27 ENCOUNTER — APPOINTMENT (OUTPATIENT)
Dept: ORTHOPEDIC SURGERY | Facility: CLINIC | Age: 64
End: 2024-03-27
Payer: MEDICAID

## 2024-03-27 DIAGNOSIS — M70.50 OTHER BURSITIS OF KNEE, UNSPECIFIED KNEE: ICD-10-CM

## 2024-03-27 PROCEDURE — 99213 OFFICE O/P EST LOW 20 MIN: CPT

## 2024-03-27 RX ORDER — CELECOXIB 100 MG/1
100 CAPSULE ORAL
Qty: 90 | Refills: 1 | Status: ACTIVE | COMMUNITY
Start: 2024-03-27 | End: 1900-01-01

## 2024-04-24 ENCOUNTER — APPOINTMENT (OUTPATIENT)
Dept: ORTHOPEDIC SURGERY | Facility: CLINIC | Age: 64
End: 2024-04-24
Payer: MEDICAID

## 2024-04-24 VITALS — HEIGHT: 65 IN | BODY MASS INDEX: 28.66 KG/M2 | RESPIRATION RATE: 16 BRPM | WEIGHT: 172 LBS

## 2024-04-24 DIAGNOSIS — M17.0 BILATERAL PRIMARY OSTEOARTHRITIS OF KNEE: ICD-10-CM

## 2024-04-24 PROCEDURE — 20610 DRAIN/INJ JOINT/BURSA W/O US: CPT | Mod: 50

## 2024-05-03 NOTE — MEDICAL STUDENT ADULT H&P (EDUCATION) - NS MD HP STUD ROS GI FT
"Reason for visit: Routine OB visit at 35w2d     CC:  \"Feeling pretty decent.\" Endorses regular fetal movement. Denies VB, LOF, contractions, h/a, visual changes, and right upper quadrant pain.     ROS: All systems reviewed and are negative with exception of the following: amenorrhea    Wt 109 kg (240 lb) lb for a TWG of 4.536 kg (10 lb), /88, FHTs 146, FH 35  Urine today and reviewed: negative glucose, trace protein      35-week ultrasound today: BPP -reassuring; SONIDO 16 cm, DVP 5.3 cm; vertex, anterior placenta    32-week U/S: EFW 92%, 2506 g; AC > 97%; SONIDO 21.3 cm, DVP 7.4 cm; UA S/D 2.2 (25%); larger for gestational age; left sided pyelectasis (13 mm); anterior placenta without signs of previa       Exam:  General Appearance:  No visualized signs of distress. Normal mood and behavior  Cardiorespiratory: HR str and reg. Lungs clear. Resp even and unlabored.  Abdomen: is soft and nontender. No CVA tenderness. Uterus is round and non-tender.  Ext: Trace edema. Calves non-tender.     Impression  Diagnoses and all orders for this visit:    1. 35 weeks gestation of pregnancy (Primary)  She has decided to formula-feed. Also plans for Dr. Almanza to be the pediatrician at the time of delivery. Discussed and encouraged practicing measures of relaxation during the birthing experience.   -     POC Urinalysis Dipstick    2. Primigravida, third trimester  Discussed third trimester of pregnancy, discomforts and measures of support, fetal movement counts,  labor warnings, preeclampsia warnings, and signs and symptoms to report. Discussed plan for next visit to include cultures for GBS, chlamydia, and gonorrhea. Patient to come to the hospital or call for vaginal bleeding, leaking of fluid, regular or intense contractions, or other concerns. Signs and Symptoms of Labor and Alternative Pain Management During Labor and Delivery videos included in the AVS.    3. Gestational hypertension, third trimester  BPP today  " - reassuring. No signs of preeclampsia. Reviewed signs for which she should come to the hospital. Presently managed with Procardia XL 60 mg. Continue with weekly  testing in the office. She sees MFM also weekly for  testing.     4. Gestational diabetes mellitus (GDM) affecting pregnancy  She did not bring a log for review. She reports her fasting blood sugars are less than 95 and her postprandial blood sugars are usually 120-130's with her taking them 1-2 hours after eating. LGA at 32 weeks. Will plan for repeat growth at 36 weeks. Continue with twice weekly  testing.         Return to the office in 1 week for routine prenatal check and as needed with concerns.        This note has been signed electronically.    Meryl Frederick, DNP, APRN, CNM, RNC-OB   Denies abdominal pain, changes in BM

## 2024-06-05 ENCOUNTER — EMERGENCY (EMERGENCY)
Facility: HOSPITAL | Age: 64
LOS: 1 days | Discharge: HOME | End: 2024-06-05
Attending: STUDENT IN AN ORGANIZED HEALTH CARE EDUCATION/TRAINING PROGRAM
Payer: COMMERCIAL

## 2024-06-05 VITALS
RESPIRATION RATE: 18 BRPM | TEMPERATURE: 98 F | OXYGEN SATURATION: 100 % | HEART RATE: 54 BPM | WEIGHT: 169.98 LBS | HEIGHT: 61 IN | SYSTOLIC BLOOD PRESSURE: 129 MMHG | DIASTOLIC BLOOD PRESSURE: 83 MMHG

## 2024-06-05 VITALS
OXYGEN SATURATION: 97 % | HEART RATE: 61 BPM | DIASTOLIC BLOOD PRESSURE: 74 MMHG | RESPIRATION RATE: 18 BRPM | SYSTOLIC BLOOD PRESSURE: 121 MMHG | TEMPERATURE: 98 F

## 2024-06-05 DIAGNOSIS — Z98.890 OTHER SPECIFIED POSTPROCEDURAL STATES: Chronic | ICD-10-CM

## 2024-06-05 LAB
ALBUMIN SERPL ELPH-MCNC: 3.4 G/DL — LOW (ref 3.5–5)
ALP SERPL-CCNC: 86 U/L — SIGNIFICANT CHANGE UP (ref 40–120)
ALT FLD-CCNC: 23 U/L DA — SIGNIFICANT CHANGE UP (ref 10–60)
ANION GAP SERPL CALC-SCNC: 3 MMOL/L — LOW (ref 5–17)
APPEARANCE UR: CLEAR — SIGNIFICANT CHANGE UP
APTT BLD: 33.7 SEC — SIGNIFICANT CHANGE UP (ref 24.5–35.6)
AST SERPL-CCNC: 12 U/L — SIGNIFICANT CHANGE UP (ref 10–40)
BACTERIA # UR AUTO: ABNORMAL /HPF
BASOPHILS # BLD AUTO: 0.01 K/UL — SIGNIFICANT CHANGE UP (ref 0–0.2)
BASOPHILS NFR BLD AUTO: 0.2 % — SIGNIFICANT CHANGE UP (ref 0–2)
BILIRUB SERPL-MCNC: 0.3 MG/DL — SIGNIFICANT CHANGE UP (ref 0.2–1.2)
BILIRUB UR-MCNC: NEGATIVE — SIGNIFICANT CHANGE UP
BLD GP AB SCN SERPL QL: SIGNIFICANT CHANGE UP
BUN SERPL-MCNC: 9 MG/DL — SIGNIFICANT CHANGE UP (ref 7–18)
CALCIUM SERPL-MCNC: 8.5 MG/DL — SIGNIFICANT CHANGE UP (ref 8.4–10.5)
CHLORIDE SERPL-SCNC: 111 MMOL/L — HIGH (ref 96–108)
CO2 SERPL-SCNC: 27 MMOL/L — SIGNIFICANT CHANGE UP (ref 22–31)
COLOR SPEC: YELLOW — SIGNIFICANT CHANGE UP
COMMENT - URINE: SIGNIFICANT CHANGE UP
CREAT SERPL-MCNC: 0.55 MG/DL — SIGNIFICANT CHANGE UP (ref 0.5–1.3)
DIFF PNL FLD: NEGATIVE — SIGNIFICANT CHANGE UP
EGFR: 103 ML/MIN/1.73M2 — SIGNIFICANT CHANGE UP
EOSINOPHIL # BLD AUTO: 0.11 K/UL — SIGNIFICANT CHANGE UP (ref 0–0.5)
EOSINOPHIL NFR BLD AUTO: 2.5 % — SIGNIFICANT CHANGE UP (ref 0–6)
EPI CELLS # UR: PRESENT
GLUCOSE SERPL-MCNC: 88 MG/DL — SIGNIFICANT CHANGE UP (ref 70–99)
GLUCOSE UR QL: NEGATIVE MG/DL — SIGNIFICANT CHANGE UP
HCT VFR BLD CALC: 38.7 % — SIGNIFICANT CHANGE UP (ref 34.5–45)
HGB BLD-MCNC: 12.5 G/DL — SIGNIFICANT CHANGE UP (ref 11.5–15.5)
IMM GRANULOCYTES NFR BLD AUTO: 0.5 % — SIGNIFICANT CHANGE UP (ref 0–0.9)
INR BLD: 0.97 RATIO — SIGNIFICANT CHANGE UP (ref 0.85–1.18)
KETONES UR-MCNC: NEGATIVE MG/DL — SIGNIFICANT CHANGE UP
LACTATE SERPL-SCNC: 0.6 MMOL/L — LOW (ref 0.7–2)
LEUKOCYTE ESTERASE UR-ACNC: ABNORMAL
LIDOCAIN IGE QN: 26 U/L — SIGNIFICANT CHANGE UP (ref 13–75)
LYMPHOCYTES # BLD AUTO: 1.98 K/UL — SIGNIFICANT CHANGE UP (ref 1–3.3)
LYMPHOCYTES # BLD AUTO: 45.6 % — HIGH (ref 13–44)
MAGNESIUM SERPL-MCNC: 2.1 MG/DL — SIGNIFICANT CHANGE UP (ref 1.6–2.6)
MCHC RBC-ENTMCNC: 29.8 PG — SIGNIFICANT CHANGE UP (ref 27–34)
MCHC RBC-ENTMCNC: 32.3 GM/DL — SIGNIFICANT CHANGE UP (ref 32–36)
MCV RBC AUTO: 92.4 FL — SIGNIFICANT CHANGE UP (ref 80–100)
MONOCYTES # BLD AUTO: 0.24 K/UL — SIGNIFICANT CHANGE UP (ref 0–0.9)
MONOCYTES NFR BLD AUTO: 5.5 % — SIGNIFICANT CHANGE UP (ref 2–14)
NEUTROPHILS # BLD AUTO: 1.98 K/UL — SIGNIFICANT CHANGE UP (ref 1.8–7.4)
NEUTROPHILS NFR BLD AUTO: 45.7 % — SIGNIFICANT CHANGE UP (ref 43–77)
NITRITE UR-MCNC: NEGATIVE — SIGNIFICANT CHANGE UP
NRBC # BLD: 0 /100 WBCS — SIGNIFICANT CHANGE UP (ref 0–0)
PH UR: 5.5 — SIGNIFICANT CHANGE UP (ref 5–8)
PLATELET # BLD AUTO: 149 K/UL — LOW (ref 150–400)
POTASSIUM SERPL-MCNC: 3.9 MMOL/L — SIGNIFICANT CHANGE UP (ref 3.5–5.3)
POTASSIUM SERPL-SCNC: 3.9 MMOL/L — SIGNIFICANT CHANGE UP (ref 3.5–5.3)
PROT SERPL-MCNC: 7 G/DL — SIGNIFICANT CHANGE UP (ref 6–8.3)
PROT UR-MCNC: NEGATIVE MG/DL — SIGNIFICANT CHANGE UP
PROTHROM AB SERPL-ACNC: 11.1 SEC — SIGNIFICANT CHANGE UP (ref 9.5–13)
RBC # BLD: 4.19 M/UL — SIGNIFICANT CHANGE UP (ref 3.8–5.2)
RBC # FLD: 13.4 % — SIGNIFICANT CHANGE UP (ref 10.3–14.5)
RBC CASTS # UR COMP ASSIST: 2 /HPF — SIGNIFICANT CHANGE UP (ref 0–4)
SODIUM SERPL-SCNC: 141 MMOL/L — SIGNIFICANT CHANGE UP (ref 135–145)
SP GR SPEC: 1.01 — SIGNIFICANT CHANGE UP (ref 1–1.03)
UROBILINOGEN FLD QL: 0.2 MG/DL — SIGNIFICANT CHANGE UP (ref 0.2–1)
WBC # BLD: 4.34 K/UL — SIGNIFICANT CHANGE UP (ref 3.8–10.5)
WBC # FLD AUTO: 4.34 K/UL — SIGNIFICANT CHANGE UP (ref 3.8–10.5)
WBC UR QL: 8 /HPF — HIGH (ref 0–5)

## 2024-06-05 PROCEDURE — 87077 CULTURE AEROBIC IDENTIFY: CPT

## 2024-06-05 PROCEDURE — 83605 ASSAY OF LACTIC ACID: CPT

## 2024-06-05 PROCEDURE — 99285 EMERGENCY DEPT VISIT HI MDM: CPT

## 2024-06-05 PROCEDURE — 83690 ASSAY OF LIPASE: CPT

## 2024-06-05 PROCEDURE — 86900 BLOOD TYPING SEROLOGIC ABO: CPT

## 2024-06-05 PROCEDURE — 81001 URINALYSIS AUTO W/SCOPE: CPT

## 2024-06-05 PROCEDURE — 74177 CT ABD & PELVIS W/CONTRAST: CPT | Mod: 26,MC

## 2024-06-05 PROCEDURE — 36415 COLL VENOUS BLD VENIPUNCTURE: CPT

## 2024-06-05 PROCEDURE — 85025 COMPLETE CBC W/AUTO DIFF WBC: CPT

## 2024-06-05 PROCEDURE — 80053 COMPREHEN METABOLIC PANEL: CPT

## 2024-06-05 PROCEDURE — 87086 URINE CULTURE/COLONY COUNT: CPT

## 2024-06-05 PROCEDURE — 86850 RBC ANTIBODY SCREEN: CPT

## 2024-06-05 PROCEDURE — 74177 CT ABD & PELVIS W/CONTRAST: CPT | Mod: MC

## 2024-06-05 PROCEDURE — 99284 EMERGENCY DEPT VISIT MOD MDM: CPT | Mod: 25

## 2024-06-05 PROCEDURE — 85730 THROMBOPLASTIN TIME PARTIAL: CPT

## 2024-06-05 PROCEDURE — 96375 TX/PRO/DX INJ NEW DRUG ADDON: CPT

## 2024-06-05 PROCEDURE — 96374 THER/PROPH/DIAG INJ IV PUSH: CPT | Mod: XU

## 2024-06-05 PROCEDURE — 85610 PROTHROMBIN TIME: CPT

## 2024-06-05 PROCEDURE — 86901 BLOOD TYPING SEROLOGIC RH(D): CPT

## 2024-06-05 PROCEDURE — 83735 ASSAY OF MAGNESIUM: CPT

## 2024-06-05 RX ORDER — IBUPROFEN 200 MG
1 TABLET ORAL
Qty: 28 | Refills: 0
Start: 2024-06-05 | End: 2024-06-11

## 2024-06-05 RX ORDER — CEFTRIAXONE 500 MG/1
1000 INJECTION, POWDER, FOR SOLUTION INTRAMUSCULAR; INTRAVENOUS ONCE
Refills: 0 | Status: COMPLETED | OUTPATIENT
Start: 2024-06-05 | End: 2024-06-05

## 2024-06-05 RX ORDER — ONDANSETRON 8 MG/1
4 TABLET, FILM COATED ORAL ONCE
Refills: 0 | Status: COMPLETED | OUTPATIENT
Start: 2024-06-05 | End: 2024-06-05

## 2024-06-05 RX ORDER — MORPHINE SULFATE 50 MG/1
4 CAPSULE, EXTENDED RELEASE ORAL ONCE
Refills: 0 | Status: DISCONTINUED | OUTPATIENT
Start: 2024-06-05 | End: 2024-06-05

## 2024-06-05 RX ORDER — SODIUM CHLORIDE 9 MG/ML
1000 INJECTION INTRAMUSCULAR; INTRAVENOUS; SUBCUTANEOUS ONCE
Refills: 0 | Status: COMPLETED | OUTPATIENT
Start: 2024-06-05 | End: 2024-06-05

## 2024-06-05 RX ADMIN — ONDANSETRON 4 MILLIGRAM(S): 8 TABLET, FILM COATED ORAL at 13:46

## 2024-06-05 RX ADMIN — Medication 1 TABLET(S): at 16:51

## 2024-06-05 RX ADMIN — MORPHINE SULFATE 4 MILLIGRAM(S): 50 CAPSULE, EXTENDED RELEASE ORAL at 13:46

## 2024-06-05 RX ADMIN — SODIUM CHLORIDE 1000 MILLILITER(S): 9 INJECTION INTRAMUSCULAR; INTRAVENOUS; SUBCUTANEOUS at 13:45

## 2024-06-05 RX ADMIN — CEFTRIAXONE 100 MILLIGRAM(S): 500 INJECTION, POWDER, FOR SOLUTION INTRAMUSCULAR; INTRAVENOUS at 15:56

## 2024-06-05 RX ADMIN — MORPHINE SULFATE 4 MILLIGRAM(S): 50 CAPSULE, EXTENDED RELEASE ORAL at 15:56

## 2024-06-05 NOTE — ED PROVIDER NOTE - CARE PLAN
Principal Discharge DX:	Diverticulitis  Secondary Diagnosis:	Breast nodule  Secondary Diagnosis:	Cyst of spleen   1

## 2024-06-05 NOTE — ED PROVIDER NOTE - OBJECTIVE STATEMENT
64-year-old female, PMH of diverticulosis, presenting with 3 days of lower abdominal pain.  Patient denies any fever, chest pain, trouble breathing, vomiting or diarrhea.  Reports the pain becomes more intense when she feels like she has used the bathroom.

## 2024-06-05 NOTE — ED ADULT NURSE NOTE - OBJECTIVE STATEMENT
63 y/o female ambulatory Pt A &O x3, PT referred to ED [facility] urgent care for Abdominal pain since Monday. Pt  denies nausea, vomiting, diarrhea, chest pain, SOB fever, cough, chills. - Urinary symptoms. Pt pmhx: Diverticulitis

## 2024-06-05 NOTE — ED PROVIDER NOTE - NSFOLLOWUPINSTRUCTIONS_ED_ALL_ED_FT
diverticulitis- take antibiotic and pain meds. return if worsens. see gastroenterologist    1.9 cm asymmetric nodular density in the lateral right breast - see primary MD   Sclerotic and lucent area in S1 - please see MD.  pancreas cyst - follow up with MD  splenic cyst? - follow up with MD    Diverticulitis: tome antibióticos y analgésicos. Vuelva si empeora. Consulte con un gastroenterólogo.  Densidad nodular asimétrica de 1,9 cm en la parte lateral del seno derecho: consulte al médico de cabecera.  Área esclerótica y lúcida en S1: consulte al médico.  Quiste pancreático: consulte con el médico.  ¿Quiste esplénico?: consulte con el médico.

## 2024-06-05 NOTE — ED PROVIDER NOTE - PROGRESS NOTE DETAILS
valadez: improve. ct findings noted. will sent augmentin. findings discussed with pt and referral request sent to A

## 2024-06-05 NOTE — ED PROVIDER NOTE - CLINICAL SUMMARY MEDICAL DECISION MAKING FREE TEXT BOX
64 female presenting with lower abdominal pain.  Patient tender on exam.  Concern for diverticulitis versus appendicitis vs uti.  Lower concern for SBO.  Will obtain labs and CT abdomen to assess.

## 2024-06-05 NOTE — ED PROVIDER NOTE - PHYSICAL EXAMINATION
General: well appearing female, no acute distress   HEENT: normocephalic, atraumatic   Respiratory: normal work of breathing  Abdomen: soft, lower abdominal tenderness to palpation   MSK: no swelling or tenderness of lower extremities, moving all extremities spontaneously   Skin: warm, dry   Neuro: A&Ox3  Psych: appropriate affect

## 2024-06-05 NOTE — ED ADULT NURSE NOTE - NSFALLUNIVINTERV_ED_ALL_ED
Bed/Stretcher in lowest position, wheels locked, appropriate side rails in place/Call bell, personal items and telephone in reach/Instruct patient to call for assistance before getting out of bed/chair/stretcher/Non-slip footwear applied when patient is off stretcher/[place] to call system/Physically safe environment - no spills, clutter or unnecessary equipment/Purposeful proactive rounding/Room/bathroom lighting operational, light cord in reach

## 2024-06-05 NOTE — ED PROVIDER NOTE - PATIENT PORTAL LINK FT
You can access the FollowMyHealth Patient Portal offered by [facility] by registering at the following website: [email or web address]. By joining [facility]’s FollowMyHealth portal, you will also be able to view your health information using other applications (apps) compatible with our system.

## 2024-06-07 LAB
CULTURE RESULTS: ABNORMAL
SPECIMEN SOURCE: SIGNIFICANT CHANGE UP

## 2024-06-26 ENCOUNTER — APPOINTMENT (OUTPATIENT)
Dept: HEMATOLOGY ONCOLOGY | Facility: CLINIC | Age: 64
End: 2024-06-26
Payer: COMMERCIAL

## 2024-06-26 DIAGNOSIS — N63.0 UNSPECIFIED LUMP IN UNSPECIFIED BREAST: ICD-10-CM

## 2024-06-26 DIAGNOSIS — M53.3 SACROCOCCYGEAL DISORDERS, NOT ELSEWHERE CLASSIFIED: ICD-10-CM

## 2024-06-26 DIAGNOSIS — D73.4 CYST OF SPLEEN: ICD-10-CM

## 2024-06-26 DIAGNOSIS — K86.2 CYST OF PANCREAS: ICD-10-CM

## 2024-06-26 PROCEDURE — 99205 OFFICE O/P NEW HI 60 MIN: CPT

## 2024-06-27 ENCOUNTER — NON-APPOINTMENT (OUTPATIENT)
Age: 64
End: 2024-06-27

## 2024-06-27 PROBLEM — K86.2 PANCREAS CYST: Status: ACTIVE | Noted: 2024-06-26

## 2024-07-03 ENCOUNTER — RESULT REVIEW (OUTPATIENT)
Age: 64
End: 2024-07-03

## 2024-07-08 ENCOUNTER — APPOINTMENT (OUTPATIENT)
Dept: MRI IMAGING | Facility: CLINIC | Age: 64
End: 2024-07-08

## 2024-07-08 PROCEDURE — 72197 MRI PELVIS W/O & W/DYE: CPT

## 2024-07-08 PROCEDURE — 74183 MRI ABD W/O CNTR FLWD CNTR: CPT

## 2024-07-08 PROCEDURE — A9585: CPT

## 2024-07-10 ENCOUNTER — APPOINTMENT (OUTPATIENT)
Dept: ULTRASOUND IMAGING | Facility: CLINIC | Age: 64
End: 2024-07-10
Payer: COMMERCIAL

## 2024-07-10 ENCOUNTER — RESULT REVIEW (OUTPATIENT)
Age: 64
End: 2024-07-10

## 2024-07-10 ENCOUNTER — OUTPATIENT (OUTPATIENT)
Dept: OUTPATIENT SERVICES | Facility: HOSPITAL | Age: 64
LOS: 1 days | End: 2024-07-10

## 2024-07-10 ENCOUNTER — APPOINTMENT (OUTPATIENT)
Dept: MAMMOGRAPHY | Facility: CLINIC | Age: 64
End: 2024-07-10
Payer: COMMERCIAL

## 2024-07-10 DIAGNOSIS — Z98.890 OTHER SPECIFIED POSTPROCEDURAL STATES: Chronic | ICD-10-CM

## 2024-07-10 PROCEDURE — 77066 DX MAMMO INCL CAD BI: CPT | Mod: 26

## 2024-07-10 PROCEDURE — 77062 BREAST TOMOSYNTHESIS BI: CPT | Mod: 26

## 2024-07-10 PROCEDURE — 76641 ULTRASOUND BREAST COMPLETE: CPT | Mod: 26,50

## 2024-07-15 ENCOUNTER — APPOINTMENT (OUTPATIENT)
Dept: HEMATOLOGY ONCOLOGY | Facility: CLINIC | Age: 64
End: 2024-07-15
Payer: COMMERCIAL

## 2024-07-15 DIAGNOSIS — M53.3 SACROCOCCYGEAL DISORDERS, NOT ELSEWHERE CLASSIFIED: ICD-10-CM

## 2024-07-15 DIAGNOSIS — D73.4 CYST OF SPLEEN: ICD-10-CM

## 2024-07-15 PROCEDURE — 99214 OFFICE O/P EST MOD 30 MIN: CPT

## 2024-07-18 ENCOUNTER — NON-APPOINTMENT (OUTPATIENT)
Age: 64
End: 2024-07-18

## 2024-07-23 ENCOUNTER — APPOINTMENT (OUTPATIENT)
Dept: NUCLEAR MEDICINE | Facility: CLINIC | Age: 64
End: 2024-07-23

## 2024-07-26 ENCOUNTER — NON-APPOINTMENT (OUTPATIENT)
Age: 64
End: 2024-07-26

## 2024-08-22 ENCOUNTER — APPOINTMENT (OUTPATIENT)
Dept: MRI IMAGING | Facility: CLINIC | Age: 64
End: 2024-08-22

## 2024-08-22 ENCOUNTER — RESULT REVIEW (OUTPATIENT)
Age: 64
End: 2024-08-22

## 2024-08-22 PROCEDURE — A9585: CPT

## 2024-08-22 PROCEDURE — 72158 MRI LUMBAR SPINE W/O & W/DYE: CPT

## 2024-08-27 ENCOUNTER — NON-APPOINTMENT (OUTPATIENT)
Age: 64
End: 2024-08-27

## 2024-08-28 DIAGNOSIS — M53.3 SACROCOCCYGEAL DISORDERS, NOT ELSEWHERE CLASSIFIED: ICD-10-CM

## 2024-09-18 ENCOUNTER — APPOINTMENT (OUTPATIENT)
Dept: HEMATOLOGY ONCOLOGY | Facility: CLINIC | Age: 64
End: 2024-09-18

## 2024-10-07 ENCOUNTER — EMERGENCY (EMERGENCY)
Facility: HOSPITAL | Age: 64
LOS: 1 days | Discharge: HOME | End: 2024-10-07
Attending: STUDENT IN AN ORGANIZED HEALTH CARE EDUCATION/TRAINING PROGRAM
Payer: COMMERCIAL

## 2024-10-07 VITALS
TEMPERATURE: 98 F | SYSTOLIC BLOOD PRESSURE: 122 MMHG | DIASTOLIC BLOOD PRESSURE: 70 MMHG | HEART RATE: 56 BPM | RESPIRATION RATE: 18 BRPM | OXYGEN SATURATION: 98 %

## 2024-10-07 VITALS — WEIGHT: 175.93 LBS

## 2024-10-07 DIAGNOSIS — Z98.890 OTHER SPECIFIED POSTPROCEDURAL STATES: Chronic | ICD-10-CM

## 2024-10-07 LAB
ALBUMIN SERPL ELPH-MCNC: 3.5 G/DL — SIGNIFICANT CHANGE UP (ref 3.5–5)
ALP SERPL-CCNC: 97 U/L — SIGNIFICANT CHANGE UP (ref 40–120)
ALT FLD-CCNC: 29 U/L DA — SIGNIFICANT CHANGE UP (ref 10–60)
ANION GAP SERPL CALC-SCNC: 5 MMOL/L — SIGNIFICANT CHANGE UP (ref 5–17)
AST SERPL-CCNC: 16 U/L — SIGNIFICANT CHANGE UP (ref 10–40)
BASOPHILS # BLD AUTO: 0.01 K/UL — SIGNIFICANT CHANGE UP (ref 0–0.2)
BASOPHILS NFR BLD AUTO: 0.3 % — SIGNIFICANT CHANGE UP (ref 0–2)
BILIRUB SERPL-MCNC: 0.3 MG/DL — SIGNIFICANT CHANGE UP (ref 0.2–1.2)
BUN SERPL-MCNC: 10 MG/DL — SIGNIFICANT CHANGE UP (ref 7–18)
CALCIUM SERPL-MCNC: 8.7 MG/DL — SIGNIFICANT CHANGE UP (ref 8.4–10.5)
CHLORIDE SERPL-SCNC: 110 MMOL/L — HIGH (ref 96–108)
CO2 SERPL-SCNC: 27 MMOL/L — SIGNIFICANT CHANGE UP (ref 22–31)
CREAT SERPL-MCNC: 0.54 MG/DL — SIGNIFICANT CHANGE UP (ref 0.5–1.3)
EGFR: 103 ML/MIN/1.73M2 — SIGNIFICANT CHANGE UP
EOSINOPHIL # BLD AUTO: 0.14 K/UL — SIGNIFICANT CHANGE UP (ref 0–0.5)
EOSINOPHIL NFR BLD AUTO: 3.6 % — SIGNIFICANT CHANGE UP (ref 0–6)
GLUCOSE SERPL-MCNC: 102 MG/DL — HIGH (ref 70–99)
HCT VFR BLD CALC: 37.3 % — SIGNIFICANT CHANGE UP (ref 34.5–45)
HGB BLD-MCNC: 12.2 G/DL — SIGNIFICANT CHANGE UP (ref 11.5–15.5)
IMM GRANULOCYTES NFR BLD AUTO: 0.3 % — SIGNIFICANT CHANGE UP (ref 0–0.9)
LYMPHOCYTES # BLD AUTO: 1.42 K/UL — SIGNIFICANT CHANGE UP (ref 1–3.3)
LYMPHOCYTES # BLD AUTO: 36.5 % — SIGNIFICANT CHANGE UP (ref 13–44)
MAGNESIUM SERPL-MCNC: 2 MG/DL — SIGNIFICANT CHANGE UP (ref 1.6–2.6)
MCHC RBC-ENTMCNC: 29.8 PG — SIGNIFICANT CHANGE UP (ref 27–34)
MCHC RBC-ENTMCNC: 32.7 GM/DL — SIGNIFICANT CHANGE UP (ref 32–36)
MCV RBC AUTO: 91 FL — SIGNIFICANT CHANGE UP (ref 80–100)
MONOCYTES # BLD AUTO: 0.26 K/UL — SIGNIFICANT CHANGE UP (ref 0–0.9)
MONOCYTES NFR BLD AUTO: 6.7 % — SIGNIFICANT CHANGE UP (ref 2–14)
NEUTROPHILS # BLD AUTO: 2.05 K/UL — SIGNIFICANT CHANGE UP (ref 1.8–7.4)
NEUTROPHILS NFR BLD AUTO: 52.6 % — SIGNIFICANT CHANGE UP (ref 43–77)
NRBC # BLD: 0 /100 WBCS — SIGNIFICANT CHANGE UP (ref 0–0)
PHOSPHATE SERPL-MCNC: 2.6 MG/DL — SIGNIFICANT CHANGE UP (ref 2.5–4.5)
PLATELET # BLD AUTO: 170 K/UL — SIGNIFICANT CHANGE UP (ref 150–400)
POTASSIUM SERPL-MCNC: 4.2 MMOL/L — SIGNIFICANT CHANGE UP (ref 3.5–5.3)
POTASSIUM SERPL-SCNC: 4.2 MMOL/L — SIGNIFICANT CHANGE UP (ref 3.5–5.3)
PROT SERPL-MCNC: 7.2 G/DL — SIGNIFICANT CHANGE UP (ref 6–8.3)
RBC # BLD: 4.1 M/UL — SIGNIFICANT CHANGE UP (ref 3.8–5.2)
RBC # FLD: 13.1 % — SIGNIFICANT CHANGE UP (ref 10.3–14.5)
SODIUM SERPL-SCNC: 142 MMOL/L — SIGNIFICANT CHANGE UP (ref 135–145)
TROPONIN I, HIGH SENSITIVITY RESULT: 3.4 NG/L — SIGNIFICANT CHANGE UP
WBC # BLD: 3.89 K/UL — SIGNIFICANT CHANGE UP (ref 3.8–10.5)
WBC # FLD AUTO: 3.89 K/UL — SIGNIFICANT CHANGE UP (ref 3.8–10.5)

## 2024-10-07 PROCEDURE — 85025 COMPLETE CBC W/AUTO DIFF WBC: CPT

## 2024-10-07 PROCEDURE — 36415 COLL VENOUS BLD VENIPUNCTURE: CPT

## 2024-10-07 PROCEDURE — 99285 EMERGENCY DEPT VISIT HI MDM: CPT

## 2024-10-07 PROCEDURE — 83735 ASSAY OF MAGNESIUM: CPT

## 2024-10-07 PROCEDURE — 84100 ASSAY OF PHOSPHORUS: CPT

## 2024-10-07 PROCEDURE — 70496 CT ANGIOGRAPHY HEAD: CPT | Mod: MC

## 2024-10-07 PROCEDURE — 93010 ELECTROCARDIOGRAM REPORT: CPT

## 2024-10-07 PROCEDURE — 80053 COMPREHEN METABOLIC PANEL: CPT

## 2024-10-07 PROCEDURE — 70498 CT ANGIOGRAPHY NECK: CPT | Mod: MC

## 2024-10-07 PROCEDURE — 93005 ELECTROCARDIOGRAM TRACING: CPT

## 2024-10-07 PROCEDURE — 82962 GLUCOSE BLOOD TEST: CPT

## 2024-10-07 PROCEDURE — 96374 THER/PROPH/DIAG INJ IV PUSH: CPT | Mod: XU

## 2024-10-07 PROCEDURE — 70496 CT ANGIOGRAPHY HEAD: CPT | Mod: 26,MC

## 2024-10-07 PROCEDURE — 70498 CT ANGIOGRAPHY NECK: CPT | Mod: 26,MC

## 2024-10-07 PROCEDURE — 99285 EMERGENCY DEPT VISIT HI MDM: CPT | Mod: 25

## 2024-10-07 PROCEDURE — 84484 ASSAY OF TROPONIN QUANT: CPT

## 2024-10-07 RX ORDER — MECLIZINE HYDROCLORIDE 25 MG/1
25 TABLET ORAL ONCE
Refills: 0 | Status: COMPLETED | OUTPATIENT
Start: 2024-10-07 | End: 2024-10-07

## 2024-10-07 RX ORDER — SODIUM CHLORIDE 0.9 % (FLUSH) 0.9 %
1000 SYRINGE (ML) INJECTION ONCE
Refills: 0 | Status: COMPLETED | OUTPATIENT
Start: 2024-10-07 | End: 2024-10-07

## 2024-10-07 RX ORDER — METOCLOPRAMIDE HCL 5 MG
10 TABLET ORAL ONCE
Refills: 0 | Status: COMPLETED | OUTPATIENT
Start: 2024-10-07 | End: 2024-10-07

## 2024-10-07 RX ORDER — MECLIZINE HYDROCLORIDE 25 MG/1
1 TABLET ORAL
Qty: 21 | Refills: 0
Start: 2024-10-07 | End: 2024-10-13

## 2024-10-07 RX ADMIN — MECLIZINE HYDROCLORIDE 25 MILLIGRAM(S): 25 TABLET ORAL at 09:22

## 2024-10-07 RX ADMIN — Medication 10 MILLIGRAM(S): at 09:19

## 2024-10-07 RX ADMIN — Medication 1000 MILLILITER(S): at 08:42

## 2024-10-07 NOTE — ED PROVIDER NOTE - NSFOLLOWUPINSTRUCTIONS_ED_ALL_ED_FT
Casar meclizine alison necesite usted para los mareos  Realice un seguimiento con el neurologo  Regrese a la kj de urgencias si empeoran koby sintomas.

## 2024-10-07 NOTE — ED PROVIDER NOTE - PHYSICAL EXAMINATION
GENERAL: no acute distress; well-developed  HEAD:  Atraumatic, Normocephalic  EYES: EOMI, PERRLA, conjunctiva and sclera clear  ENT: MMM; oropharynx clear  NECK: Supple, No JVD  CHEST/LUNG: Clear to auscultation bilaterally; No wheeze  HEART: Regular rate and rhythm; No murmurs, rubs, or gallops  ABDOMEN: Soft, Nontender, Nondistended; Bowel sounds present  EXTREMITIES:  2+ Peripheral Pulses, No clubbing, cyanosis, or edema  PSYCH: AAOx3  NEUROLOGY: no focal motor or sensory deficits. 5/5 muscle strength in all extremities. CN II-XII grossly intact. Negative Pronator Drift. Normal finger to nose. Normal Gait. HINTS negative.   SKIN: No rashes or lesions GENERAL: no acute distress; well-developed  HEAD:  Atraumatic, Normocephalic  EYES: EOMI, PERRLA, conjunctiva and sclera clear +horizontal nystagmus.   ENT: MMM; oropharynx clear  NECK: Supple, No JVD  CHEST/LUNG: Clear to auscultation bilaterally; No wheeze  HEART: Regular rate and rhythm; No murmurs, rubs, or gallops  ABDOMEN: Soft, Nontender, Nondistended; Bowel sounds present  EXTREMITIES:  2+ Peripheral Pulses, No clubbing, cyanosis, or edema  PSYCH: AAOx3  NEUROLOGY: no focal motor or sensory deficits. 5/5 muscle strength in all extremities. CN II-XII grossly intact. Negative Pronator Drift. Normal finger to nose. Normal Gait. HINTS negative.   SKIN: No rashes or lesions

## 2024-10-07 NOTE — ED PROVIDER NOTE - NSFOLLOWUPCLINICS_GEN_ALL_ED_FT
[facility]  Neurology  [address] [place] [address]  Phone: [phone]  Fax: [phone]

## 2024-10-07 NOTE — ED ADULT NURSE NOTE - NSFALLRISKINTERV_ED_ALL_ED

## 2024-10-07 NOTE — ED PROVIDER NOTE - PATIENT PORTAL LINK FT
You can access the FollowMyHealth Patient Portal offered by [facility] by registering at the following website: [email or web address]. By joining [facility]’s FollowMyHealth portal, you will also be able to view your health information using other applications (apps) compatible with our system.

## 2024-10-07 NOTE — ED PROVIDER NOTE - CLINICAL SUMMARY MEDICAL DECISION MAKING FREE TEXT BOX
63 y/o F PMH of GERD, osteoporosis presenting with vertigo.  Likely peripheral in nature, nonfocal neurological exam  Obtain CTA Head/Neck  Meclizine, Reglan  Screening labs  EKG no acute ischemic changes  Disposition as per above evaluation. 63 y/o F PMH of GERD, osteoporosis presenting with vertigo.  Likely peripheral in nature, nonfocal neurological exam. HINTS negative.   Obtain CTA Head/Neck  Meclizine, Reglan  Screening labs  EKG no acute ischemic changes  Disposition as per above evaluation.

## 2024-10-07 NOTE — ED PROVIDER NOTE - OBJECTIVE STATEMENT
65 y/o F PMH of GERD, osteoporosis presenting with vertigo.    Notes acute onset around 1AM this morning when went to use bathroom-- associated headache and difficulty walking with 4 associated episodes of vomiting. Episode of vertigo in Dec '22, imaging reviewed negative at that time. Transient chest tightness this morning also, now resolved.

## 2024-10-07 NOTE — ED PROVIDER NOTE - PROGRESS NOTE DETAILS
Patient reassessed. Ambulating with steady gait. Patient reassessed. Ambulating with steady gait. Feels improved. Strict return precautions discussed for worsening sx

## 2024-10-23 NOTE — ED ADULT NURSE NOTE - IS THE PATIENT ABLE TO BE SCREENED?
Take your blood pressure twice a day.  If your blood pressures greater than 160/90, take 25 mg of hydralazine.  I have prescribed this.  Be sure to follow-up with your primary care doctor within the next week.  
Yes